# Patient Record
Sex: MALE | Race: WHITE | NOT HISPANIC OR LATINO | Employment: FULL TIME | URBAN - METROPOLITAN AREA
[De-identification: names, ages, dates, MRNs, and addresses within clinical notes are randomized per-mention and may not be internally consistent; named-entity substitution may affect disease eponyms.]

---

## 2017-04-04 ENCOUNTER — TELEPHONE (OUTPATIENT)
Dept: FAMILY MEDICINE | Facility: CLINIC | Age: 47
End: 2017-04-04

## 2017-04-04 DIAGNOSIS — Z20.828 EXPOSURE TO INFLUENZA: Primary | ICD-10-CM

## 2017-04-04 NOTE — TELEPHONE ENCOUNTER
Daughter was diagnosed with influenza B  Requesting preventive [place]    Jocy Lea RN, BS  Clinical Nurse Triage.

## 2017-04-05 RX ORDER — OSELTAMIVIR PHOSPHATE 75 MG/1
75 CAPSULE ORAL DAILY
Qty: 10 CAPSULE | Refills: 0 | Status: SHIPPED | OUTPATIENT
Start: 2017-04-05 | End: 2023-01-19

## 2018-11-30 ENCOUNTER — TRANSFERRED RECORDS (OUTPATIENT)
Dept: HEALTH INFORMATION MANAGEMENT | Facility: CLINIC | Age: 48
End: 2018-11-30

## 2019-11-07 ENCOUNTER — HEALTH MAINTENANCE LETTER (OUTPATIENT)
Age: 49
End: 2019-11-07

## 2020-11-29 ENCOUNTER — HEALTH MAINTENANCE LETTER (OUTPATIENT)
Age: 50
End: 2020-11-29

## 2021-09-25 ENCOUNTER — HEALTH MAINTENANCE LETTER (OUTPATIENT)
Age: 51
End: 2021-09-25

## 2022-01-15 ENCOUNTER — HEALTH MAINTENANCE LETTER (OUTPATIENT)
Age: 52
End: 2022-01-15

## 2022-12-26 ENCOUNTER — HEALTH MAINTENANCE LETTER (OUTPATIENT)
Age: 52
End: 2022-12-26

## 2023-01-19 ENCOUNTER — OFFICE VISIT (OUTPATIENT)
Dept: FAMILY MEDICINE | Facility: CLINIC | Age: 53
End: 2023-01-19
Payer: COMMERCIAL

## 2023-01-19 VITALS
OXYGEN SATURATION: 98 % | BODY MASS INDEX: 32.64 KG/M2 | WEIGHT: 228 LBS | HEART RATE: 68 BPM | DIASTOLIC BLOOD PRESSURE: 80 MMHG | RESPIRATION RATE: 16 BRPM | SYSTOLIC BLOOD PRESSURE: 116 MMHG | TEMPERATURE: 98.1 F | HEIGHT: 70 IN

## 2023-01-19 DIAGNOSIS — G47.00 PERSISTENT DISORDER OF INITIATING OR MAINTAINING SLEEP: ICD-10-CM

## 2023-01-19 DIAGNOSIS — Z11.59 NEED FOR HEPATITIS C SCREENING TEST: ICD-10-CM

## 2023-01-19 DIAGNOSIS — Z13.220 SCREENING FOR HYPERLIPIDEMIA: ICD-10-CM

## 2023-01-19 DIAGNOSIS — Z00.00 ROUTINE GENERAL MEDICAL EXAMINATION AT A HEALTH CARE FACILITY: Primary | ICD-10-CM

## 2023-01-19 PROCEDURE — 99213 OFFICE O/P EST LOW 20 MIN: CPT | Mod: 25 | Performed by: FAMILY MEDICINE

## 2023-01-19 PROCEDURE — 91313 COVID-19 VACCINE BIVALENT BOOSTER 18+ (MODERNA): CPT | Performed by: FAMILY MEDICINE

## 2023-01-19 PROCEDURE — 90715 TDAP VACCINE 7 YRS/> IM: CPT | Performed by: FAMILY MEDICINE

## 2023-01-19 PROCEDURE — 0134A COVID-19 VACCINE BIVALENT BOOSTER 18+ (MODERNA): CPT | Performed by: FAMILY MEDICINE

## 2023-01-19 PROCEDURE — 99386 PREV VISIT NEW AGE 40-64: CPT | Mod: 25 | Performed by: FAMILY MEDICINE

## 2023-01-19 PROCEDURE — 90471 IMMUNIZATION ADMIN: CPT | Performed by: FAMILY MEDICINE

## 2023-01-19 ASSESSMENT — ENCOUNTER SYMPTOMS
CONSTIPATION: 0
SORE THROAT: 0
DIZZINESS: 0
WEAKNESS: 0
FREQUENCY: 0
ABDOMINAL PAIN: 0
DIARRHEA: 0
JOINT SWELLING: 0
SHORTNESS OF BREATH: 0
HEARTBURN: 0
PARESTHESIAS: 0
HEMATOCHEZIA: 0
HEMATURIA: 0
MYALGIAS: 0
EYE PAIN: 0
NAUSEA: 0
COUGH: 0
PALPITATIONS: 0
ARTHRALGIAS: 0
NERVOUS/ANXIOUS: 0
DYSURIA: 0
CHILLS: 0
FEVER: 0
HEADACHES: 0

## 2023-01-19 ASSESSMENT — PAIN SCALES - GENERAL: PAINLEVEL: NO PAIN (0)

## 2023-01-19 NOTE — PROGRESS NOTES
SUBJECTIVE:   CC: Quentin is an 52 year old who presents for preventative health visit.   Patient has been advised of split billing requirements and indicates understanding: Yes  Healthy Habits:     Getting at least 3 servings of Calcium per day:  Yes    Bi-annual eye exam:  Yes    Dental care twice a year:  Yes    Sleep apnea or symptoms of sleep apnea:  Daytime drowsiness    Diet:  Regular (no restrictions)    Frequency of exercise:  4-5 days/week    Duration of exercise:  30-45 minutes    Taking medications regularly:  Yes    Medication side effects:  None    PHQ-2 Total Score: 0    Additional concerns today:  Yes    Feeling well.    PmHx, SHx, FHx reviewed and updated.    Some difficulty falling asleep, will typically take more than hour.  Stopped watching TV in the bedroom.  Does keep a pretty consistent sleep schedule.  Minimal caffeine.          Today's PHQ-2 Score:   PHQ-2 ( 1999 [employer]) 1/19/2023   Q1: Little interest or pleasure in doing things 0   Q2: Feeling down, depressed or hopeless 0   PHQ-2 Score 0   Q1: Little interest or pleasure in doing things Not at all   Q2: Feeling down, depressed or hopeless Not at all   PHQ-2 Score 0       Have you ever done Advance Care Planning? (For example, a Health Directive, POLST, or a discussion with a medical provider or your loved ones about your wishes): No, advance care planning information given to patient to review.  Patient declined advance care planning discussion at this time.    Social History     Tobacco Use     Smoking status: Never     Smokeless tobacco: Never   Substance Use Topics     Alcohol use: Yes     Comment: 0-1 drinks a week     If you drink alcohol do you typically have >3 drinks per day or >7 drinks per week? No    Alcohol Use 1/19/2023   Prescreen: >3 drinks/day or >7 drinks/week? No   Prescreen: >3 drinks/day or >7 drinks/week? -       Last PSA: No results found for: PSA    Reviewed orders with patient. Reviewed health maintenance and updated  "orders accordingly - Yes  Lab work is in process  Labs reviewed in EPIC    Reviewed and updated as needed this visit by clinical staff   Tobacco  Allergies  Meds              Reviewed and updated as needed this visit by Provider                     Review of Systems   Constitutional: Negative for chills and fever.   HENT: Negative for congestion, ear pain, hearing loss and sore throat.    Eyes: Negative for pain and visual disturbance.   Respiratory: Negative for cough and shortness of breath.    Cardiovascular: Negative for chest pain, palpitations and peripheral edema.   Gastrointestinal: Negative for abdominal pain, constipation, diarrhea, heartburn, hematochezia and nausea.   Genitourinary: Negative for dysuria, frequency, genital sores, hematuria, impotence, penile discharge and urgency.   Musculoskeletal: Negative for arthralgias, joint swelling and myalgias.   Skin: Negative for rash.   Neurological: Negative for dizziness, weakness, headaches and paresthesias.   Psychiatric/Behavioral: Negative for mood changes. The patient is not nervous/anxious.          OBJECTIVE:   /80 (BP Location: Right arm, Patient Position: Sitting, Cuff Size: Adult Large)   Pulse 68   Temp 98.1  F (36.7  C) (Oral)   Resp 16   Ht 1.784 m (5' 10.25\")   Wt 103.4 kg (228 lb)   SpO2 98%   BMI 32.48 kg/m      Physical Exam  Vitals and nursing note reviewed.   Constitutional:       Appearance: He is well-developed.   HENT:      Head: Normocephalic and atraumatic.      Right Ear: Tympanic membrane, ear canal and external ear normal.      Left Ear: Tympanic membrane, ear canal and external ear normal.   Eyes:      Pupils: Pupils are equal, round, and reactive to light.   Neck:      Thyroid: No thyromegaly.   Cardiovascular:      Rate and Rhythm: Normal rate and regular rhythm.      Heart sounds: Normal heart sounds.   Pulmonary:      Effort: Pulmonary effort is normal.      Breath sounds: Normal breath sounds.   Abdominal:    "   General: Bowel sounds are normal.      Palpations: Abdomen is soft. There is no mass.   Musculoskeletal:         General: Normal range of motion.   Lymphadenopathy:      Cervical: No cervical adenopathy.   Skin:     General: Skin is warm and dry.      Findings: No rash.   Neurological:      Mental Status: He is alert and oriented to person, place, and time.   Psychiatric:         Judgment: Judgment normal.               ASSESSMENT/PLAN:       ICD-10-CM    1. Routine general medical examination at a health care facility  Z00.00 Comprehensive metabolic panel (BMP + Alb, Alk Phos, ALT, AST, Total. Bili, TP)      2. Need for hepatitis C screening test  Z11.59 Hepatitis C Screen Reflex to HCV RNA Quant and Genotype      3. Screening for hyperlipidemia  Z13.220 Lipid panel reflex to direct LDL Fasting      4. Persistent disorder of initiating or maintaining sleep  G47.00         Advise trial of 3-5mg melatonin 1/2 hour before bed.  Sleep hygiene reviewed.        COUNSELING:   Reviewed preventive health counseling, as reflected in patient instructions        He reports that he has never smoked. He has never used smokeless tobacco.        Quentin Dang MD  [facility]

## 2023-02-03 ENCOUNTER — LAB (OUTPATIENT)
Dept: LAB | Facility: CLINIC | Age: 53
End: 2023-02-03
Payer: COMMERCIAL

## 2023-02-03 DIAGNOSIS — Z00.00 ROUTINE GENERAL MEDICAL EXAMINATION AT A HEALTH CARE FACILITY: ICD-10-CM

## 2023-02-03 DIAGNOSIS — Z11.59 NEED FOR HEPATITIS C SCREENING TEST: ICD-10-CM

## 2023-02-03 DIAGNOSIS — Z13.220 SCREENING FOR HYPERLIPIDEMIA: ICD-10-CM

## 2023-02-03 LAB
ALBUMIN SERPL BCG-MCNC: 4.4 G/DL (ref 3.5–5.2)
ALP SERPL-CCNC: 54 U/L (ref 40–129)
ALT SERPL W P-5'-P-CCNC: 16 U/L (ref 10–50)
ANION GAP SERPL CALCULATED.3IONS-SCNC: 10 MMOL/L (ref 7–15)
AST SERPL W P-5'-P-CCNC: 19 U/L (ref 10–50)
BILIRUB SERPL-MCNC: 1.1 MG/DL
BUN SERPL-MCNC: 17.9 MG/DL (ref 6–20)
CALCIUM SERPL-MCNC: 9.2 MG/DL (ref 8.6–10)
CHLORIDE SERPL-SCNC: 107 MMOL/L (ref 98–107)
CHOLEST SERPL-MCNC: 161 MG/DL
CREAT SERPL-MCNC: 1.17 MG/DL (ref 0.67–1.17)
DEPRECATED HCO3 PLAS-SCNC: 23 MMOL/L (ref 22–29)
GFR SERPL CREATININE-BSD FRML MDRD: 75 ML/MIN/1.73M2
GLUCOSE SERPL-MCNC: 110 MG/DL (ref 70–99)
HCV AB SERPL QL IA: NONREACTIVE
HDLC SERPL-MCNC: 31 MG/DL
LDLC SERPL CALC-MCNC: 79 MG/DL
NONHDLC SERPL-MCNC: 130 MG/DL
POTASSIUM SERPL-SCNC: 4.2 MMOL/L (ref 3.4–5.3)
PROT SERPL-MCNC: 6.7 G/DL (ref 6.4–8.3)
SODIUM SERPL-SCNC: 140 MMOL/L (ref 136–145)
TRIGL SERPL-MCNC: 255 MG/DL

## 2023-02-03 PROCEDURE — 80061 LIPID PANEL: CPT

## 2023-02-03 PROCEDURE — 36415 COLL VENOUS BLD VENIPUNCTURE: CPT

## 2023-02-03 PROCEDURE — 80053 COMPREHEN METABOLIC PANEL: CPT

## 2023-02-03 PROCEDURE — 86803 HEPATITIS C AB TEST: CPT

## 2023-12-10 ENCOUNTER — HOSPITAL ENCOUNTER (INPATIENT)
Facility: CLINIC | Age: 53
LOS: 2 days | Discharge: HOME | End: 2023-12-12
Attending: EMERGENCY MEDICINE | Admitting: INTERNAL MEDICINE
Payer: COMMERCIAL

## 2023-12-10 ENCOUNTER — APPOINTMENT (OUTPATIENT)
Dept: CT IMAGING | Facility: CLINIC | Age: 53
End: 2023-12-10
Attending: EMERGENCY MEDICINE
Payer: COMMERCIAL

## 2023-12-10 ENCOUNTER — OFFICE VISIT (OUTPATIENT)
Dept: URGENT CARE | Facility: URGENT CARE | Age: 53
End: 2023-12-10
Payer: COMMERCIAL

## 2023-12-10 VITALS
HEIGHT: 69 IN | DIASTOLIC BLOOD PRESSURE: 80 MMHG | WEIGHT: 220 LBS | SYSTOLIC BLOOD PRESSURE: 116 MMHG | HEART RATE: 91 BPM | OXYGEN SATURATION: 98 % | BODY MASS INDEX: 32.58 KG/M2 | TEMPERATURE: 99.2 F

## 2023-12-10 DIAGNOSIS — K57.20 DIVERTICULITIS OF COLON WITH PERFORATION: ICD-10-CM

## 2023-12-10 DIAGNOSIS — R50.9 FEVER IN ADULT: ICD-10-CM

## 2023-12-10 DIAGNOSIS — R10.31 ABDOMINAL PAIN, RIGHT LOWER QUADRANT: Primary | ICD-10-CM

## 2023-12-10 LAB
ALBUMIN UR-MCNC: 70 MG/DL
ANION GAP SERPL CALCULATED.3IONS-SCNC: 13 MMOL/L (ref 7–15)
APPEARANCE UR: CLEAR
BACTERIA #/AREA URNS HPF: ABNORMAL /HPF
BASOPHILS # BLD AUTO: 0 10E3/UL (ref 0–0.2)
BASOPHILS NFR BLD AUTO: 0 %
BILIRUB UR QL STRIP: NEGATIVE
BUN SERPL-MCNC: 17.3 MG/DL (ref 6–20)
CALCIUM SERPL-MCNC: 9.1 MG/DL (ref 8.6–10)
CHLORIDE SERPL-SCNC: 98 MMOL/L (ref 98–107)
COLOR UR AUTO: YELLOW
CREAT SERPL-MCNC: 1.2 MG/DL (ref 0.67–1.17)
DEPRECATED HCO3 PLAS-SCNC: 23 MMOL/L (ref 22–29)
EGFRCR SERPLBLD CKD-EPI 2021: 72 ML/MIN/1.73M2
EOSINOPHIL # BLD AUTO: 0.3 10E3/UL (ref 0–0.7)
EOSINOPHIL NFR BLD AUTO: 3 %
ERYTHROCYTE [DISTWIDTH] IN BLOOD BY AUTOMATED COUNT: 12.8 % (ref 10–15)
GLUCOSE SERPL-MCNC: 107 MG/DL (ref 70–99)
GLUCOSE UR STRIP-MCNC: NEGATIVE MG/DL
HCT VFR BLD AUTO: 44.1 % (ref 40–53)
HGB BLD-MCNC: 15 G/DL (ref 13.3–17.7)
HGB UR QL STRIP: ABNORMAL
HOLD SPECIMEN: NORMAL
IMM GRANULOCYTES # BLD: 0.1 10E3/UL
IMM GRANULOCYTES NFR BLD: 1 %
KETONES UR STRIP-MCNC: 10 MG/DL
LEUKOCYTE ESTERASE UR QL STRIP: NEGATIVE
LYMPHOCYTES # BLD AUTO: 1.2 10E3/UL (ref 0.8–5.3)
LYMPHOCYTES NFR BLD AUTO: 12 %
MCH RBC QN AUTO: 28.6 PG (ref 26.5–33)
MCHC RBC AUTO-ENTMCNC: 34 G/DL (ref 31.5–36.5)
MCV RBC AUTO: 84 FL (ref 78–100)
MONOCYTES # BLD AUTO: 0.8 10E3/UL (ref 0–1.3)
MONOCYTES NFR BLD AUTO: 8 %
MUCOUS THREADS #/AREA URNS LPF: PRESENT /LPF
NEUTROPHILS # BLD AUTO: 7.7 10E3/UL (ref 1.6–8.3)
NEUTROPHILS NFR BLD AUTO: 76 %
NITRATE UR QL: NEGATIVE
NRBC # BLD AUTO: 0 10E3/UL
NRBC BLD AUTO-RTO: 0 /100
PH UR STRIP: 6 [PH] (ref 5–7)
PLATELET # BLD AUTO: 329 10E3/UL (ref 150–450)
POTASSIUM SERPL-SCNC: 3.6 MMOL/L (ref 3.4–5.3)
RBC # BLD AUTO: 5.25 10E6/UL (ref 4.4–5.9)
RBC URINE: 8 /HPF
SODIUM SERPL-SCNC: 134 MMOL/L (ref 135–145)
SP GR UR STRIP: 1.03 (ref 1–1.03)
SQUAMOUS EPITHELIAL: 1 /HPF
UROBILINOGEN UR STRIP-MCNC: 2 MG/DL
WBC # BLD AUTO: 10.2 10E3/UL (ref 4–11)
WBC URINE: 4 /HPF

## 2023-12-10 PROCEDURE — 250N000011 HC RX IP 250 OP 636: Performed by: EMERGENCY MEDICINE

## 2023-12-10 PROCEDURE — 258N000003 HC RX IP 258 OP 636: Performed by: INTERNAL MEDICINE

## 2023-12-10 PROCEDURE — 85025 COMPLETE CBC W/AUTO DIFF WBC: CPT | Performed by: EMERGENCY MEDICINE

## 2023-12-10 PROCEDURE — 258N000003 HC RX IP 258 OP 636: Performed by: EMERGENCY MEDICINE

## 2023-12-10 PROCEDURE — 99207 PR INPT ADMISSION FROM CLINIC: CPT | Performed by: PHYSICIAN ASSISTANT

## 2023-12-10 PROCEDURE — 74177 CT ABD & PELVIS W/CONTRAST: CPT

## 2023-12-10 PROCEDURE — 36415 COLL VENOUS BLD VENIPUNCTURE: CPT | Performed by: EMERGENCY MEDICINE

## 2023-12-10 PROCEDURE — 80048 BASIC METABOLIC PNL TOTAL CA: CPT | Performed by: EMERGENCY MEDICINE

## 2023-12-10 PROCEDURE — 120N000001 HC R&B MED SURG/OB

## 2023-12-10 PROCEDURE — 96360 HYDRATION IV INFUSION INIT: CPT | Mod: 59

## 2023-12-10 PROCEDURE — 250N000011 HC RX IP 250 OP 636: Mod: JZ | Performed by: EMERGENCY MEDICINE

## 2023-12-10 PROCEDURE — 99223 1ST HOSP IP/OBS HIGH 75: CPT | Performed by: INTERNAL MEDICINE

## 2023-12-10 PROCEDURE — 81001 URINALYSIS AUTO W/SCOPE: CPT | Performed by: EMERGENCY MEDICINE

## 2023-12-10 PROCEDURE — 99285 EMERGENCY DEPT VISIT HI MDM: CPT | Mod: 25

## 2023-12-10 RX ORDER — PIPERACILLIN SODIUM, TAZOBACTAM SODIUM 4; .5 G/20ML; G/20ML
4.5 INJECTION, POWDER, LYOPHILIZED, FOR SOLUTION INTRAVENOUS ONCE
Status: COMPLETED | OUTPATIENT
Start: 2023-12-10 | End: 2023-12-10

## 2023-12-10 RX ORDER — PIPERACILLIN SODIUM, TAZOBACTAM SODIUM 4; .5 G/20ML; G/20ML
4.5 INJECTION, POWDER, LYOPHILIZED, FOR SOLUTION INTRAVENOUS EVERY 6 HOURS
Status: DISCONTINUED | OUTPATIENT
Start: 2023-12-11 | End: 2023-12-12 | Stop reason: HOSPADM

## 2023-12-10 RX ORDER — KETOROLAC TROMETHAMINE 15 MG/ML
15 INJECTION, SOLUTION INTRAMUSCULAR; INTRAVENOUS EVERY 6 HOURS PRN
Status: DISCONTINUED | OUTPATIENT
Start: 2023-12-10 | End: 2023-12-11

## 2023-12-10 RX ORDER — PROCHLORPERAZINE MALEATE 5 MG
10 TABLET ORAL EVERY 6 HOURS PRN
Status: DISCONTINUED | OUTPATIENT
Start: 2023-12-10 | End: 2023-12-12 | Stop reason: HOSPADM

## 2023-12-10 RX ORDER — IOPAMIDOL 755 MG/ML
500 INJECTION, SOLUTION INTRAVASCULAR ONCE
Status: COMPLETED | OUTPATIENT
Start: 2023-12-10 | End: 2023-12-10

## 2023-12-10 RX ORDER — CALCIUM CARBONATE 500 MG/1
1000 TABLET, CHEWABLE ORAL 4 TIMES DAILY PRN
Status: DISCONTINUED | OUTPATIENT
Start: 2023-12-10 | End: 2023-12-12 | Stop reason: HOSPADM

## 2023-12-10 RX ORDER — LIDOCAINE 40 MG/G
CREAM TOPICAL
Status: DISCONTINUED | OUTPATIENT
Start: 2023-12-10 | End: 2023-12-12 | Stop reason: HOSPADM

## 2023-12-10 RX ORDER — ACETAMINOPHEN 650 MG/1
650 SUPPOSITORY RECTAL EVERY 4 HOURS PRN
Status: DISCONTINUED | OUTPATIENT
Start: 2023-12-10 | End: 2023-12-12 | Stop reason: HOSPADM

## 2023-12-10 RX ORDER — AMOXICILLIN 250 MG
1 CAPSULE ORAL 2 TIMES DAILY PRN
Status: DISCONTINUED | OUTPATIENT
Start: 2023-12-10 | End: 2023-12-12 | Stop reason: HOSPADM

## 2023-12-10 RX ORDER — PROCHLORPERAZINE 25 MG
25 SUPPOSITORY, RECTAL RECTAL EVERY 12 HOURS PRN
Status: DISCONTINUED | OUTPATIENT
Start: 2023-12-10 | End: 2023-12-12 | Stop reason: HOSPADM

## 2023-12-10 RX ORDER — SODIUM CHLORIDE 9 MG/ML
INJECTION, SOLUTION INTRAVENOUS CONTINUOUS
Status: ACTIVE | OUTPATIENT
Start: 2023-12-10 | End: 2023-12-11

## 2023-12-10 RX ORDER — AMOXICILLIN 250 MG
2 CAPSULE ORAL 2 TIMES DAILY PRN
Status: DISCONTINUED | OUTPATIENT
Start: 2023-12-10 | End: 2023-12-12 | Stop reason: HOSPADM

## 2023-12-10 RX ORDER — OXYCODONE HYDROCHLORIDE 5 MG/1
5 TABLET ORAL EVERY 6 HOURS PRN
Status: DISCONTINUED | OUTPATIENT
Start: 2023-12-10 | End: 2023-12-12 | Stop reason: HOSPADM

## 2023-12-10 RX ORDER — ACETAMINOPHEN 325 MG/1
650 TABLET ORAL EVERY 4 HOURS PRN
Status: DISCONTINUED | OUTPATIENT
Start: 2023-12-10 | End: 2023-12-12 | Stop reason: HOSPADM

## 2023-12-10 RX ORDER — ONDANSETRON 4 MG/1
4 TABLET, ORALLY DISINTEGRATING ORAL EVERY 6 HOURS PRN
Status: DISCONTINUED | OUTPATIENT
Start: 2023-12-10 | End: 2023-12-12 | Stop reason: HOSPADM

## 2023-12-10 RX ORDER — ONDANSETRON 2 MG/ML
4 INJECTION INTRAMUSCULAR; INTRAVENOUS EVERY 6 HOURS PRN
Status: DISCONTINUED | OUTPATIENT
Start: 2023-12-10 | End: 2023-12-12 | Stop reason: HOSPADM

## 2023-12-10 RX ADMIN — SODIUM CHLORIDE: 9 INJECTION, SOLUTION INTRAVENOUS at 19:30

## 2023-12-10 RX ADMIN — PIPERACILLIN AND TAZOBACTAM 4.5 G: 4; .5 INJECTION, POWDER, FOR SOLUTION INTRAVENOUS at 17:59

## 2023-12-10 RX ADMIN — IOPAMIDOL 100 ML: 755 INJECTION, SOLUTION INTRAVENOUS at 16:39

## 2023-12-10 RX ADMIN — SODIUM CHLORIDE 1000 ML: 9 INJECTION, SOLUTION INTRAVENOUS at 16:12

## 2023-12-10 ASSESSMENT — ACTIVITIES OF DAILY LIVING (ADL)
TOILETING_ISSUES: NO
ADLS_ACUITY_SCORE: 20
CONCENTRATING,_REMEMBERING_OR_MAKING_DECISIONS_DIFFICULTY: NO
ADLS_ACUITY_SCORE: 20
DIFFICULTY_EATING/SWALLOWING: NO
HEARING_DIFFICULTY_OR_DEAF: NO
DOING_ERRANDS_INDEPENDENTLY_DIFFICULTY: NO
ADLS_ACUITY_SCORE: 35
WEAR_GLASSES_OR_BLIND: YES
DIFFICULTY_COMMUNICATING: NO
DRESSING/BATHING_DIFFICULTY: NO
ADLS_ACUITY_SCORE: 35
CHANGE_IN_FUNCTIONAL_STATUS_SINCE_ONSET_OF_CURRENT_ILLNESS/INJURY: NO
WALKING_OR_CLIMBING_STAIRS_DIFFICULTY: NO
FALL_HISTORY_WITHIN_LAST_SIX_MONTHS: NO

## 2023-12-10 NOTE — ED PROVIDER NOTES
History     Chief Complaint:  Abdominal Pain and Generalized Weakness       HPI   Quentin López is a 53 year old male who presents with RLQ abdominal pain. The pain began as a mild sensation but over the past 3 days he has had worsening pain that is describes as intermittent sharp pain. He has also had intermittent diarrhea, subjective fevers, chills, and overall decreased intake and output. He has a cough as well. No nausea, vomiting, dysuria, frequency, hematuria, or groin pain.       Independent Historian:   None - Patient Only    Review of External Notes:   I reviewed urgent care note from earlier today when the patient was referred to the ER for further evaluation.      Medications:    The patient is currently on no regular medications.    Past Medical History:    Denies significant past medical history.    Past Surgical History:    ACL repair  Colonoscopy     Physical Exam   Patient Vitals for the past 24 hrs:   BP Temp Temp src Pulse Resp SpO2   12/10/23 1559 (!) 146/99 98.6  F (37  C) Temporal 103 22 99 %        Physical Exam  Vitals and nursing note reviewed.   Constitutional:       General: He is not in acute distress.     Appearance: He is not ill-appearing.   HENT:      Head: Normocephalic and atraumatic.      Right Ear: External ear normal.      Left Ear: External ear normal.      Nose: Nose normal.   Eyes:      Conjunctiva/sclera: Conjunctivae normal.   Cardiovascular:      Rate and Rhythm: Normal rate and regular rhythm.      Heart sounds: No murmur heard.  Pulmonary:      Effort: Pulmonary effort is normal. No respiratory distress.      Breath sounds: No wheezing, rhonchi or rales.   Abdominal:      General: Abdomen is flat. There is no distension.      Palpations: Abdomen is soft.      Tenderness: There is abdominal tenderness in the right lower quadrant. There is guarding. There is no rebound.   Musculoskeletal:         General: No swelling or deformity.      Cervical back: Normal range of  motion and neck supple.   Skin:     General: Skin is warm and dry.      Findings: No rash.   Neurological:      Mental Status: He is alert and oriented to person, place, and time.   Psychiatric:         Behavior: Behavior normal.           Emergency Department Course     Imaging:  CT Abdomen Pelvis w Contrast   Final Result   IMPRESSION:    1.  Acute sigmoid diverticulitis with likely contained/local perforation as described above. No large volume pneumoperitoneum or drainable fluid collection at this time. Consider follow-up colonoscopy after treatment if not recently performed.   2.  Partially visualized tree-in-bud pulmonary opacity in the right middle lobe, favor infectious/inflammatory process.   3.  Likely hepatic steatosis.           Laboratory:  Labs Ordered and Resulted from Time of ED Arrival to Time of ED Departure   BASIC METABOLIC PANEL - Abnormal       Result Value    Sodium 134 (*)     Potassium 3.6      Chloride 98      Carbon Dioxide (CO2) 23      Anion Gap 13      Urea Nitrogen 17.3      Creatinine 1.20 (*)     GFR Estimate 72      Calcium 9.1      Glucose 107 (*)    ROUTINE UA WITH MICROSCOPIC REFLEX TO CULTURE - Abnormal    Color Urine Yellow      Appearance Urine Clear      Glucose Urine Negative      Bilirubin Urine Negative      Ketones Urine 10 (*)     Specific Gravity Urine 1.030      Blood Urine Small (*)     pH Urine 6.0      Protein Albumin Urine 70 (*)     Urobilinogen Urine 2.0      Nitrite Urine Negative      Leukocyte Esterase Urine Negative      Bacteria Urine Few (*)     Mucus Urine Present (*)     RBC Urine 8 (*)     WBC Urine 4      Squamous Epithelials Urine 1     CBC WITH PLATELETS AND DIFFERENTIAL    WBC Count 10.2      RBC Count 5.25      Hemoglobin 15.0      Hematocrit 44.1      MCV 84      MCH 28.6      MCHC 34.0      RDW 12.8      Platelet Count 329      % Neutrophils 76      % Lymphocytes 12      % Monocytes 8      % Eosinophils 3      % Basophils 0      % Immature  Granulocytes 1      NRBCs per 100 WBC 0      Absolute Neutrophils 7.7      Absolute Lymphocytes 1.2      Absolute Monocytes 0.8      Absolute Eosinophils 0.3      Absolute Basophils 0.0      Absolute Immature Granulocytes 0.1      Absolute NRBCs 0.0          Emergency Department Course & Assessments:             Interventions:  Medications   piperacillin-tazobactam (ZOSYN) 4.5 g vial to attach to  mL bag (4.5 g Intravenous $New Bag 12/10/23 2524)   lidocaine 1 % 0.1-1 mL (has no administration in time range)   lidocaine (LMX4) cream (has no administration in time range)   sodium chloride (PF) 0.9% PF flush 3 mL (has no administration in time range)   sodium chloride (PF) 0.9% PF flush 3 mL (has no administration in time range)   senna-docusate (SENOKOT-S/PERICOLACE) 8.6-50 MG per tablet 1 tablet (has no administration in time range)     Or   senna-docusate (SENOKOT-S/PERICOLACE) 8.6-50 MG per tablet 2 tablet (has no administration in time range)   calcium carbonate (TUMS) chewable tablet 1,000 mg (has no administration in time range)   acetaminophen (TYLENOL) tablet 650 mg (has no administration in time range)     Or   acetaminophen (TYLENOL) Suppository 650 mg (has no administration in time range)   ondansetron (ZOFRAN ODT) ODT tab 4 mg (has no administration in time range)     Or   ondansetron (ZOFRAN) injection 4 mg (has no administration in time range)   prochlorperazine (COMPAZINE) injection 10 mg (has no administration in time range)     Or   prochlorperazine (COMPAZINE) tablet 10 mg (has no administration in time range)     Or   prochlorperazine (COMPAZINE) suppository 25 mg (has no administration in time range)   piperacillin-tazobactam (ZOSYN) 4.5 g vial to attach to  mL bag (has no administration in time range)   sodium chloride 0.9 % infusion (has no administration in time range)   ketorolac (TORADOL) injection 15 mg (has no administration in time range)   oxyCODONE (ROXICODONE) tablet 5 mg  (has no administration in time range)   sodium chloride 0.9% BOLUS 1,000 mL (1,000 mLs Intravenous $New Bag 12/10/23 1612)   iopamidol (ISOVUE-370) solution 500 mL (100 mLs Intravenous $Given 12/10/23 1639)   sodium chloride (PF) 0.9% PF flush 100 mL (65 mLs Intravenous $Given 12/10/23 1639)        Assessments:  1605 I obtained history and examined the patient, as noted above.  1731 I rechecked and updated the patient.    Independent Interpretation (X-rays, CTs, rhythm strip):  None    Consultations/Discussion of Management or Tests:  1738 I spoke with Dr. Lopez from the hospitalist service regarding the patient's presentation, findings here in the ED, and plan of care.        Social Determinants of Health affecting care:   None    Disposition:  The patient was admitted to the hospital under the care of Dr. Lopez.     Impression & Plan    CMS Diagnoses: None    Medical Decision Makin-year-old male presenting with right lower quadrant pain.  Differential diagnosis includes appendicitis, mesenteric adenitis, epiploic appendagitis, ureteral stone, pyelonephritis, enteritis, etc.  Will check labs and CT for further evaluation.  Patient declines any pain medications.    1738 patient CT shows signs of diverticulitis with contained perforation.  Patient was updated on these findings.  He was given a dose of Zosyn.  I discussed with the hospitalist who accepts admission.      Diagnosis:    ICD-10-CM    1. Diverticulitis of colon with perforation  K57.20              Scribe Disclosure:  I, Narayan Villegas, am serving as a scribe at 4:04 PM on 12/10/2023 to document services personally performed by Nasim Oliver MD based on my observations and the provider's statements to me.   12/10/2023   Nasim Oliver MD Goodwin, Shaun M, MD  12/10/23 1535

## 2023-12-10 NOTE — PROGRESS NOTES
"Assessment & Plan     Abdominal pain, right lower quadrant    DDx: Appendicitis, Diverticulitis, epiploic appendagitis, UTI, Pyelonephritis, Kidney Stone, Bowel Obstruction, Adhesions etc...     Acute problem.  On exam patient is in no acute distress.  Worsening symptoms over the past 5 days.  Low-grade fever here today.  Given limited diagnostic and imaging capabilities in the urgent care setting, recommended further evaluation in the ED to rule out appendicitis versus other intra abdominal abnormalities.  Patient is discharged from urgent care in stable condition.  Transfer to [facility] ED via private car.    Fever in adult  Intermittent fever for the past 5 days with associated right lower quadrant abdominal pain.  Transfer to [facility] ED today for further evaluation.      Return today (on 12/10/2023) for follow up at [facility] ED today for further evaluation.    Dior Lewis PA-C  [facility] ALEXANDREA Saavedra is a 53 year old male who presents to clinic today for the following health issues:  Chief Complaint   Patient presents with    Urgent Care     Dull/sharp pain with chills, night sweats and low grade fever on/off x 6d.      HPI      Abdominal Pain    Location: RLQ   Radiation: None.    Pain character: dull and sharp  Severity: 8 on a scale of 1-10 when sharp.    Duration:  5 days  Course of Illness: worsening  Exacerbated by:  none  Relieved by: nothing  Associated symptoms: chills, sweats, LGF, decreased appetite. Diarrhea earlier this week, now resolved.   Denies: vomiting, melena, hematochezia, dysuria, frequency, and hematuria.  Surgical History: none      Review of Systems  Constitutional, HEENT, cardiovascular, pulmonary, GI, , musculoskeletal, neuro, skin, endocrine and psych systems are negative, except as otherwise noted.      Objective    /80   Pulse 91   Temp 99.2  F (37.3  C) (Oral)   Ht 1.753 m (5' 9\")   Wt 99.8 kg (220 lb)  "  SpO2 98%   BMI 32.49 kg/m    Physical Exam   GENERAL: healthy, alert and no distress  RESP: lungs clear to auscultation - no rales, rhonchi or wheezes  CV: regular rate and rhythm, normal S1 S2  ABDOMEN: soft,  moderate TTP RLQ, no hepatosplenomegaly, no masses and bowel sounds normal  MS: no gross musculoskeletal defects noted, no edema

## 2023-12-10 NOTE — ED NOTES
ED Nurse Handoff Report    ED Chief complaint: Abdominal Pain and Generalized Weakness  . ED Diagnosis:   Final diagnoses:   Diverticulitis of colon with perforation       Allergies:   Allergies   Allergen Reactions    Seasonal Allergies        Code Status: Full Code    Activity level - Baseline/Home:  independent.  Activity Level - Current:   standby.   Lift room needed: No.   Bariatric: No   Needed: No   Isolation: No.   Infection: Not Applicable.     Respiratory status: Room air    Vital Signs (within 30 minutes):   Vitals:    12/10/23 1559   BP: (!) 146/99   Pulse: 103   Resp: 22   Temp: 98.6  F (37  C)   TempSrc: Temporal   SpO2: 99%       Cardiac Rhythm:  ,      Pain level:    Patient confused: No.   Patient Falls Risk: nonskid shoes/slippers when out of bed and activity supervised.   Elimination Status: Has voided     Patient Report - Initial Complaint: Abdominal pain, generalized weakness.   Focused Assessment:   ED arrival note:   Pt reports that he has been having RLQ abdominal pain with low grade subjective fevers per pt. PT states that he has had decreased intake and output. PT denies surgical hx. PT VSS and ABC's intact     RLQ abdominal pain. The pain began as a mild sensation but over the past 3 days he has had worsening pain that is describes as intermittent sharp pain. He has also had intermittent diarrhea, subjective fevers, chills, and overall decreased intake and output. He has a cough as well. No nausea, vomiting, dysuria, frequency, hematuria, or groin pain.      Abnormal Results:   Labs Ordered and Resulted from Time of ED Arrival to Time of ED Departure   BASIC METABOLIC PANEL - Abnormal       Result Value    Sodium 134 (*)     Potassium 3.6      Chloride 98      Carbon Dioxide (CO2) 23      Anion Gap 13      Urea Nitrogen 17.3      Creatinine 1.20 (*)     GFR Estimate 72      Calcium 9.1      Glucose 107 (*)    ROUTINE UA WITH MICROSCOPIC REFLEX  TO CULTURE - Abnormal    Color Urine Yellow      Appearance Urine Clear      Glucose Urine Negative      Bilirubin Urine Negative      Ketones Urine 10 (*)     Specific Gravity Urine 1.030      Blood Urine Small (*)     pH Urine 6.0      Protein Albumin Urine 70 (*)     Urobilinogen Urine 2.0      Nitrite Urine Negative      Leukocyte Esterase Urine Negative      Bacteria Urine Few (*)     Mucus Urine Present (*)     RBC Urine 8 (*)     WBC Urine 4      Squamous Epithelials Urine 1     CBC WITH PLATELETS AND DIFFERENTIAL    WBC Count 10.2      RBC Count 5.25      Hemoglobin 15.0      Hematocrit 44.1      MCV 84      MCH 28.6      MCHC 34.0      RDW 12.8      Platelet Count 329      % Neutrophils 76      % Lymphocytes 12      % Monocytes 8      % Eosinophils 3      % Basophils 0      % Immature Granulocytes 1      NRBCs per 100 WBC 0      Absolute Neutrophils 7.7      Absolute Lymphocytes 1.2      Absolute Monocytes 0.8      Absolute Eosinophils 0.3      Absolute Basophils 0.0      Absolute Immature Granulocytes 0.1      Absolute NRBCs 0.0          CT Abdomen Pelvis w Contrast   Final Result   IMPRESSION:    1.  Acute sigmoid diverticulitis with likely contained/local perforation as described above. No large volume pneumoperitoneum or drainable fluid collection at this time. Consider follow-up colonoscopy after treatment if not recently performed.   2.  Partially visualized tree-in-bud pulmonary opacity in the right middle lobe, favor infectious/inflammatory process.   3.  Likely hepatic steatosis.          Treatments provided: See MAR  Family Comments: Significant other at bedside  OBS brochure/video discussed/provided to patient:  Yes  ED Medications:   Medications   piperacillin-tazobactam (ZOSYN) 4.5 g vial to attach to  mL bag (has no administration in time range)   sodium chloride 0.9% BOLUS 1,000 mL (1,000 mLs Intravenous $New Bag 12/10/23 1612)   iopamidol (ISOVUE-370) solution 500 mL (100 mLs  Intravenous $Given 12/10/23 1639)   sodium chloride (PF) 0.9% PF flush 100 mL (65 mLs Intravenous $Given 12/10/23 1639)       Drips infusing:  No  For the majority of the shift this patient was Green.   Interventions performed were N/A.    Sepsis treatment initiated: No    Cares/treatment/interventions/medications to be completed following ED care: N/A    ED Nurse Name: Maya Balderas RN  5:48 PM    RECEIVING UNIT ED HANDOFF REVIEW    Above ED Nurse Handoff Report was reviewed: Yes  Reviewed by: Rosangela Luis RN on December 10, 2023 at 6:30 PM

## 2023-12-10 NOTE — H&P
[facility]    History and Physical - Hospitalist Service       Date of Admission:  12/10/2023    Assessment & Plan      Quentin López is a 53 year old male admitted on 12/10/2023. He as a past medical history of colonic polyps status post removal 5 years ago and no other past medical history   Patient is being admitted to the hospital after he has been having abdominal pain which has been ongoing for 5 days    Noted to have evidence of a microperforation without abscess formation of the right sigmoid diverticula  1/.  Sigmoid diverticulitis with microperforation which is self-limiting.  Overall patient is feeling better because of the sudden Micropore of which I suppose has happened earlier today.  Will continue with IV antibiotics, has received the first dose in the emergency room.  Will keep him on just ice chips and give him bowel rest.  Patient has not required any pain medications in the emergency room will have as needed hydromorphone/ketorolac be available.  2/.  History of familial polyposis without malignancy patient is scheduled as mentioned below to undergo colonoscopy in 3 months or sooner  3/.  Abdominal pain does not appear to be a surgical abdomen hence I am disinclined to obtain an urgent general surgery consult/colorectal consult.  This can definitely be added on should there be a worsening of overall situation.    Patient is likely going to the hospital for at least a couple days to receive IV antibiotics, diet can be advanced as tolerated.          Diet: NPO for Medical/Clinical Reasons Except for: No Exceptions    DVT Prophylaxis: Low Risk/Ambulatory with no VTE prophylaxis indicated  Teran Catheter: Not present  Lines: None     Cardiac Monitoring: None  Code Status:  Full code    Clinically Significant Risk Factors Present on Admission                       # Obesity: Estimated body mass index is 32.49 kg/m  as calculated from the following:    Height as of an earlier  "encounter on 12/10/23: 1.753 m (5' 9\").    Weight as of an earlier encounter on 12/10/23: 99.8 kg (220 lb).              Disposition Plan    2 days       Thelma Lopez MD  Hospitalist Service  [facility]  Securely message with [email or web address] (more info)  Text page via [facility] Paging/Directory     ______________________________________________________________________    Chief Complaint   Abdominal pain    History is obtained from the patient, electronic health record, and emergency department physician    History of Present Illness   Quentin López is a 53 year old male who has a past medical history of colonic polyps status post removal 5 years ago and no other past medical history which is relevant is being admitted to the emergency department with above complaints.  According the patient, for the last 4-5 days he has been having intermittent right-sided lower abdominal pain with low-grade fever and decreased appetite.  He tells me there has been no change in the bladder or bowel habits.  He has not had a similar pain in the past.  He went to urgent care where there was concerns for possible appendicitis or other intra-abdominal pathology hence was sent to the emergency room workup.  Revealed to have normal WBC count, normal lactic acid unremarkable metabolic panel and there was a microperforation of diverticulitis with no evidence of abscess formation.  In light of the above patient has been requested for inpatient admission.  At the time of my evaluation of the patient in the emergency room he is accompanied by his wife he tells me he actually feels much better this afternoon and as if the pain had suddenly been relieved.  No change in bowel habits, last bowel movement was 2 hours ago.  No nausea or vomiting and no blood in stools.  Patient has a family history of polyposis and hence started having colonoscopies at the age of 43, and has had total of 2 colonoscopies thus far.  The last colonoscopy " 2 polyps were removed which were both benign.  He is scheduled to undergo colonoscopy within the next 60 days.  Patient tells me he is otherwise well he does not have any family history of early colon cancer      Past Medical History    No past medical history on file.    Past Surgical History   Past Surgical History:   Procedure Laterality Date    ARTHROSCOPIC RECONSTRUCTION ANTERIOR AND POSTERIOR CRUCIATE LIGAMENT, COMBINED      left    COLONOSCOPY  10/30/2013    Procedure: COMBINED COLONOSCOPY, SINGLE BIOPSY/POLYPECTOMY BY BIOPSY;  Colonoscopy with biopsy  ;  Surgeon: Lina Padron MD;  Location:  GI       Prior to Admission Medications   None        Review of Systems    The 10 point Review of Systems is negative other than noted in the HPI or here.     Social History   I have reviewed this patient's social history and updated it with pertinent information if needed.  Social History     Tobacco Use    Smoking status: Never    Smokeless tobacco: Never   Vaping Use    Vaping Use: Never used   Substance Use Topics    Alcohol use: Yes     Comment: 0-1 drinks a week    Drug use: No         Family History   I have reviewed this patient's family history and updated it with pertinent information if needed.  Family History   Problem Relation Age of Onset    Lipids Mother     Macular Degeneration Mother     Heart Disease Sister         MI    C.A.D. Brother     Cancer - colorectal Paternal Grandmother 50         Allergies   Allergies   Allergen Reactions    Seasonal Allergies         Physical Exam   Vital Signs: Temp: 98.6  F (37  C) Temp src: Temporal BP: (!) 146/99 Pulse: 103   Resp: 22 SpO2: 99 % O2 Device: None (Room air)    Weight: 0 lbs 0 oz    General Appearance: Alert awake oriented x 3 no acute distress  Respiratory: Clear to auscultation  Cardiovascular: S1-S2  GI: Soft nondistended no voluntary guarding, no rebound or rigidity, bowel sounds are present  Skin: No rash or lesions  Other: No neurological  deficit    Medical Decision Making       76 MINUTES SPENT BY ME on the date of service doing chart review, history, exam, documentation & further activities per the note.      Data     I have personally reviewed the following data over the past 24 hrs:    10.2  \   15.0   / 329     134 (L) 98 17.3 /  107 (H)   3.6 23 1.20 (H) \       Imaging results reviewed over the past 24 hrs:   Recent Results (from the past 24 hour(s))   CT Abdomen Pelvis w Contrast    Narrative    EXAM: CT ABDOMEN PELVIS W CONTRAST  LOCATION: [facility]  DATE: 12/10/2023    INDICATION: Right lower quadrant pain  COMPARISON: None.  TECHNIQUE: CT scan of the abdomen and pelvis was performed following injection of IV contrast. Multiplanar reformats were obtained. Dose reduction techniques were used.  CONTRAST: 100mL Isovue 370    FINDINGS:   LOWER CHEST: Patchy, likely tree-in-bud opacity in the right middle lobe. No additional airspace consolidation or pleural effusion.    HEPATOBILIARY: Diffuse hepatic steatosis. Somewhat geographic area of hypoattenuation in the posterior right hepatic lobe, favor focal fat deposition, less likely atypical cystic lesion, no specific follow-up recommended. No evidence of biliary   obstruction.    PANCREAS: Normal.    SPLEEN: Normal.    ADRENAL GLANDS: Normal.    KIDNEYS/BLADDER: Large right renal cyst, no specific follow-up recommended. No hydronephrosis. Urinary bladder is decompressed but otherwise unremarkable.    BOWEL: Extensive sigmoid diverticulosis with focal inflammation surrounding a diverticulum in the mid sigmoid colon (series 3 image 176). There is adjacent mesenteric stranding and extraluminal gas which appears to extend into the extraperitoneal right   hemipelvis. No large volume pneumoperitoneum, drainable fluid collection or bowel obstruction at this time. Remainder of the colon is unremarkable. Normal appendix.    LYMPH NODES: No suspicious  lymphadenopathy.    VASCULATURE: Unremarkable.    PELVIC ORGANS: Normal.    MUSCULOSKELETAL: No acute bony abnormality.        Impression    IMPRESSION:   1.  Acute sigmoid diverticulitis with likely contained/local perforation as described above. No large volume pneumoperitoneum or drainable fluid collection at this time. Consider follow-up colonoscopy after treatment if not recently performed.  2.  Partially visualized tree-in-bud pulmonary opacity in the right middle lobe, favor infectious/inflammatory process.  3.  Likely hepatic steatosis.

## 2023-12-10 NOTE — ED TRIAGE NOTES
Pt reports that he has been having RLQ abdominal pain with low grade subjective fevers per pt. PT states that he has had decreased intake and output. PT denies surgical hx. PT VSS and ABC's intact

## 2023-12-10 NOTE — PATIENT INSTRUCTIONS
RLQ abdominal pain, fever persistent symptoms x 5 days  Given limited diagnostic imaging and lab capabilities in the urgent care setting recommend further evaluation in the ED to r/o acute appendicitis vs other intra abdominal abnormalities.

## 2023-12-11 LAB
ALBUMIN SERPL BCG-MCNC: 3.4 G/DL (ref 3.5–5.2)
ALP SERPL-CCNC: 64 U/L (ref 40–150)
ALT SERPL W P-5'-P-CCNC: 53 U/L (ref 0–70)
ANION GAP SERPL CALCULATED.3IONS-SCNC: 7 MMOL/L (ref 7–15)
AST SERPL W P-5'-P-CCNC: 23 U/L (ref 0–45)
BASOPHILS # BLD AUTO: 0.1 10E3/UL (ref 0–0.2)
BASOPHILS NFR BLD AUTO: 1 %
BILIRUB SERPL-MCNC: 1.4 MG/DL
BUN SERPL-MCNC: 13.4 MG/DL (ref 6–20)
CALCIUM SERPL-MCNC: 8.3 MG/DL (ref 8.6–10)
CHLORIDE SERPL-SCNC: 106 MMOL/L (ref 98–107)
CREAT SERPL-MCNC: 1.2 MG/DL (ref 0.67–1.17)
DEPRECATED HCO3 PLAS-SCNC: 26 MMOL/L (ref 22–29)
EGFRCR SERPLBLD CKD-EPI 2021: 72 ML/MIN/1.73M2
EOSINOPHIL # BLD AUTO: 0.5 10E3/UL (ref 0–0.7)
EOSINOPHIL NFR BLD AUTO: 7 %
ERYTHROCYTE [DISTWIDTH] IN BLOOD BY AUTOMATED COUNT: 13 % (ref 10–15)
GLUCOSE SERPL-MCNC: 105 MG/DL (ref 70–99)
HCT VFR BLD AUTO: 39.5 % (ref 40–53)
HGB BLD-MCNC: 13.1 G/DL (ref 13.3–17.7)
IMM GRANULOCYTES # BLD: 0.1 10E3/UL
IMM GRANULOCYTES NFR BLD: 1 %
LYMPHOCYTES # BLD AUTO: 1.2 10E3/UL (ref 0.8–5.3)
LYMPHOCYTES NFR BLD AUTO: 15 %
MCH RBC QN AUTO: 28.4 PG (ref 26.5–33)
MCHC RBC AUTO-ENTMCNC: 33.2 G/DL (ref 31.5–36.5)
MCV RBC AUTO: 86 FL (ref 78–100)
MONOCYTES # BLD AUTO: 0.7 10E3/UL (ref 0–1.3)
MONOCYTES NFR BLD AUTO: 8 %
NEUTROPHILS # BLD AUTO: 5.8 10E3/UL (ref 1.6–8.3)
NEUTROPHILS NFR BLD AUTO: 68 %
NRBC # BLD AUTO: 0 10E3/UL
NRBC BLD AUTO-RTO: 0 /100
PLATELET # BLD AUTO: 302 10E3/UL (ref 150–450)
POTASSIUM SERPL-SCNC: 4.1 MMOL/L (ref 3.4–5.3)
PROT SERPL-MCNC: 6.1 G/DL (ref 6.4–8.3)
RBC # BLD AUTO: 4.62 10E6/UL (ref 4.4–5.9)
SODIUM SERPL-SCNC: 139 MMOL/L (ref 135–145)
WBC # BLD AUTO: 8.4 10E3/UL (ref 4–11)

## 2023-12-11 PROCEDURE — 80053 COMPREHEN METABOLIC PANEL: CPT | Performed by: INTERNAL MEDICINE

## 2023-12-11 PROCEDURE — 99232 SBSQ HOSP IP/OBS MODERATE 35: CPT | Performed by: INTERNAL MEDICINE

## 2023-12-11 PROCEDURE — 85025 COMPLETE CBC W/AUTO DIFF WBC: CPT | Performed by: INTERNAL MEDICINE

## 2023-12-11 PROCEDURE — 258N000003 HC RX IP 258 OP 636: Performed by: INTERNAL MEDICINE

## 2023-12-11 PROCEDURE — 36415 COLL VENOUS BLD VENIPUNCTURE: CPT | Performed by: INTERNAL MEDICINE

## 2023-12-11 PROCEDURE — 250N000011 HC RX IP 250 OP 636: Performed by: INTERNAL MEDICINE

## 2023-12-11 PROCEDURE — 120N000001 HC R&B MED SURG/OB

## 2023-12-11 RX ADMIN — SODIUM CHLORIDE: 9 INJECTION, SOLUTION INTRAVENOUS at 08:08

## 2023-12-11 RX ADMIN — PIPERACILLIN AND TAZOBACTAM 4.5 G: 4; .5 INJECTION, POWDER, FOR SOLUTION INTRAVENOUS at 05:23

## 2023-12-11 RX ADMIN — PIPERACILLIN AND TAZOBACTAM 4.5 G: 4; .5 INJECTION, POWDER, FOR SOLUTION INTRAVENOUS at 18:01

## 2023-12-11 RX ADMIN — PIPERACILLIN AND TAZOBACTAM 4.5 G: 4; .5 INJECTION, POWDER, FOR SOLUTION INTRAVENOUS at 11:57

## 2023-12-11 RX ADMIN — PIPERACILLIN AND TAZOBACTAM 4.5 G: 4; .5 INJECTION, POWDER, FOR SOLUTION INTRAVENOUS at 00:44

## 2023-12-11 ASSESSMENT — ACTIVITIES OF DAILY LIVING (ADL)
ADLS_ACUITY_SCORE: 20

## 2023-12-11 NOTE — PLAN OF CARE
Goal Outcome Evaluation:  To Do:  End of Shift Summary  For vital signs and complete assessments, please see documentation flowsheets.     Pertinent assessments: A&O--- up in room and halls --- tolerating clear diet denies pain and nausea ---  Major Shift Events resting well  Treatment Plan: monitor  Bedside Nurse: Cat Delgadillo RN

## 2023-12-11 NOTE — CONSULTS
[facility]  Colon and Rectal Surgery Consult Note  Name: Quentin López    MRN: [id number]  YOB: 1970    Age: 53 year old  Date of admission: 12/10/2023  Primary care provider: Quentin Dang     Requesting Physician: Dr. Mar  Reason for consult: Diverticulitis with microperforation           History of Present Illness:   Quentin López is a 53 year old male with a history of colon polyps, seen at the request of Dr. Mar, presents with abdominal pain. The patient reports that he started to have abdominal pain last Wednesday that was constant but dull. On Sunday, the pain progressed and he started to have intermittent sharp lower and right sided abdominal pains. This prompted him to present to the ER.  In the ER, the patient was afebrile   And was slightly tachycardic to 103 and hypertensive to 146/99.  Labs were remarkable for a sodium 134, creatinine of 1.2, WBC 10.2, and hemoglobin of 15. A CT scan of the abdomen and pelvis revealed acute sigmoid diverticulitis with likely contained and local perforation.  No large volume pneumoperitoneum or drainable fluid collection identified. There was also a partial visualized pulmonary opacity in the right middle lobe and likely hepatic steatosis. He was admitted for further management and treatment and started on IV Zosyn.    Today, the patient reports that his pain has slightly improved.  He does not have much pain if he is just lying in bed. He denies any nausea, vomiting, fever and chills.  He had some looser stools last week, but these have started to form up.  His last bowel movement was last night and was soft and formed.  No bleeding.  This is his 1st episode of diverticulitis. He remains afebrile. AVSS.     Of note, he has a family history of familial adenomatous polyposis.  He denies any family history of colorectal cancer.    Colonoscopy History:  2018: Diverticulosis. Repeat in 5 years. He is due in 60-90 days per  "patient.     Surgical History:   No prior abdominal surgeries            Past Medical History:   No past medical history on file.          Past Surgical History:     Past Surgical History:   Procedure Laterality Date    ARTHROSCOPIC RECONSTRUCTION ANTERIOR AND POSTERIOR CRUCIATE LIGAMENT, COMBINED      left    COLONOSCOPY  10/30/2013    Procedure: COMBINED COLONOSCOPY, SINGLE BIOPSY/POLYPECTOMY BY BIOPSY;  Colonoscopy with biopsy  ;  Surgeon: Lina Padron MD;  Location:  GI               Social History:     Social History     Tobacco Use    Smoking status: Never    Smokeless tobacco: Never   Substance Use Topics    Alcohol use: Yes     Comment: 0-1 drinks a week             Family History:     Family History   Problem Relation Age of Onset    Lipids Mother     Macular Degeneration Mother     Heart Disease Sister         MI    C.A.D. Brother     Cancer - colorectal Paternal Grandmother 50             Allergies:     Allergies   Allergen Reactions    Seasonal Allergies              Medications:      piperacillin-tazobactam  4.5 g Intravenous Q6H    sodium chloride (PF)  3 mL Intracatheter Q8H             Review of Systems:   A comprehensive greater than 10 system review of systems was carried out.  Pertinent positives and negatives are noted above.  Otherwise negative for contributory info.            Physical Exam:     Blood pressure 107/63, pulse 73, temperature 98.6  F (37  C), temperature source Oral, resp. rate 16, height 1.753 m (5' 9\"), weight 102.4 kg (225 lb 12 oz), SpO2 95%.    Intake/Output Summary (Last 24 hours) at 12/11/2023 0938  Last data filed at 12/10/2023 1902  Gross per 24 hour   Intake 3 ml   Output --   Net 3 ml     EXAM:  GEN: Awake alert and oriented, appears stated age  PULM: Non-labored breathing with normal respiratory effort  CVS: reg rate and rhythm, no peripheral edema  ABD: Soft, mildly tender to suprapubic and right lower quadrant, non distended. No rebound or guarding "   RECTAL: Rectal exam was deferred  NEURO: CN II-XII grossly intact  MSK: extremeties with no clubbing, cyanosis or edema; able to ambulate  PSYCH: responsive, alert, cooperative; oriented x3; appropriate mood and affect  EXT/SKIN: inspection reveals no rashes, lesions or ulcers, normal coloring         Data Reviewed:     Results for orders placed or performed during the hospital encounter of 12/10/23   CT Abdomen Pelvis w Contrast    Narrative    EXAM: CT ABDOMEN PELVIS W CONTRAST  LOCATION: [facility]  DATE: 12/10/2023    INDICATION: Right lower quadrant pain  COMPARISON: None.  TECHNIQUE: CT scan of the abdomen and pelvis was performed following injection of IV contrast. Multiplanar reformats were obtained. Dose reduction techniques were used.  CONTRAST: 100mL Isovue 370    FINDINGS:   LOWER CHEST: Patchy, likely tree-in-bud opacity in the right middle lobe. No additional airspace consolidation or pleural effusion.    HEPATOBILIARY: Diffuse hepatic steatosis. Somewhat geographic area of hypoattenuation in the posterior right hepatic lobe, favor focal fat deposition, less likely atypical cystic lesion, no specific follow-up recommended. No evidence of biliary   obstruction.    PANCREAS: Normal.    SPLEEN: Normal.    ADRENAL GLANDS: Normal.    KIDNEYS/BLADDER: Large right renal cyst, no specific follow-up recommended. No hydronephrosis. Urinary bladder is decompressed but otherwise unremarkable.    BOWEL: Extensive sigmoid diverticulosis with focal inflammation surrounding a diverticulum in the mid sigmoid colon (series 3 image 176). There is adjacent mesenteric stranding and extraluminal gas which appears to extend into the extraperitoneal right   hemipelvis. No large volume pneumoperitoneum, drainable fluid collection or bowel obstruction at this time. Remainder of the colon is unremarkable. Normal appendix.    LYMPH NODES: No suspicious lymphadenopathy.    VASCULATURE:  "Unremarkable.    PELVIC ORGANS: Normal.    MUSCULOSKELETAL: No acute bony abnormality.        Impression    IMPRESSION:   1.  Acute sigmoid diverticulitis with likely contained/local perforation as described above. No large volume pneumoperitoneum or drainable fluid collection at this time. Consider follow-up colonoscopy after treatment if not recently performed.  2.  Partially visualized tree-in-bud pulmonary opacity in the right middle lobe, favor infectious/inflammatory process.  3.  Likely hepatic steatosis.       Recent Labs   Lab 12/10/23  1607   WBC 10.2   HGB 15.0   HCT 44.1   MCV 84        Recent Labs   Lab 12/11/23  0622 12/10/23  1607    134*   POTASSIUM 4.1 3.6   CHLORIDE 106 98   CO2 26 23   ANIONGAP 7 13   * 107*   BUN 13.4 17.3   CR 1.20* 1.20*   GFRESTIMATED 72 72   VIKKI 8.3* 9.1   PROTTOTAL 6.1*  --    ALBUMIN 3.4*  --    BILITOTAL 1.4*  --    ALKPHOS 64  --    AST 23  --    ALT 53  --      No results for input(s): \"INR\" in the last 168 hours.  No results for input(s): \"LACT\" in the last 168 hours.  Recent Labs   Lab 12/10/23  1640   COLOR Yellow   APPEARANCE Clear   URINEGLC Negative   URINEBILI Negative   URINEKETONE 10*   SG 1.030   UBLD Small*   URINEPH 6.0   PROTEIN 70*   NITRITE Negative   LEUKEST Negative   RBCU 8*   WBCU 4         Assessment and Plan:   Quentin López is a 53 year old male with a history of colon polyps, seen at the request of Dr. Mar, presents with progressive abdominal pain over the last 5 days. A CT scan of the abdomen and pelvis revealed acute sigmoid diverticulitis with likely contained and local perforation. WBC normal yesterday. Abdominal exam benign. Recommend continued conservative management including bowel rest and IV antibiotics. Continue NPO for now, but he can likely have clear liquids this afternoon if he continues to do well. Did briefly discuss that if he does not improve, we may repeat a CT scan to evaluate for a potential drainable " abscess. Otherwise if he acutely worsens or continues to fail to improve, he may require more emergent surgery which would likely entail a sigmoid resection with a possible temporary stoma. He will need a follow up colonoscopy in 6-8 weeks after the resolution of this episode of diverticulitis. We will continue to follow along.     Plan:  Admit to hospitalist  Surgery: No emergent surgery indicated  Diet: NPO. May be able to have clear liquids later today if continues to do well.   IV Fluids: Per hospitalist  Antibiotics:  IV Zosyn  Medications: Continue home meds per hospitalist  I&O s:  strict I&O s  Labs:   - Reviewed  - Ordered: None   Imaging:   - Dr. Laboy  and myself have personally viewed: CT abd/pelvis  - Ordered:  None  Activity: ambulate as tolerated, encourage OOB  DVT prophylaxis: SCD s  This plan has been discussed with Dr. Laboy    Patient specific identified risk factors considered as part of today s evaluation include: Family history of FAP      Additional history obtained from chart and patient.  Time spent on consultation: 50 minutes, greater than 50 percent of the total encounter time is spent in counseling and/or coordination of care          Jerica Caraballo PA-C  [facility]  Phone:  [phone]

## 2023-12-11 NOTE — PHARMACY-ADMISSION MEDICATION HISTORY
Pharmacist Admission Medication History    Admission medication history is complete. The information provided in this note is only as accurate as the sources available at the time of the update.    Information Source(s): Patient via in-person    Pertinent Information: None    Changes made to PTA medication list:  Added: None  Deleted: None  Changed: None    Medication Affordability:       Allergies reviewed with patient and updates made in EHR: yes    Medication History Completed By: Shay Howard [facility] 12/10/2023 6:29 PM    No outpatient medications have been marked as taking for the 12/10/23 encounter (Hospital Encounter).

## 2023-12-11 NOTE — PLAN OF CARE
Pertinent assessments: A&Ox4. Up ind. VSS. RA. Denies pain/nausea. BS active, passing gas.      Major Shift Events: Uneventful    Treatment Plan: IV zosyn, IVF, Bowel rest  Bedside Nurse: Nuria Juarez RN

## 2023-12-11 NOTE — PROGRESS NOTES
"[facility]  Hospitalist Progress Note  Shree Hair MD 12/11/2023    Reason for Stay (Diagnosis): Abdominal pain         Assessment and Plan:      Summary of Stay: Quentin López is a 53 year old male PMH significant for colonic polyps status post polypectomy 5 years ago due to have another colonoscopy soon who presents to the emergency room with ongoing abdominal pain for the last 5 days and found to have sigmoid diverticulitis with microperforation and admitted on 12/10/2023.      Problem List:   Acute sigmoid diverticulitis with microperforation.  Prior history of colonic polyp  -Patient presented with abdominal pain ongoing for 5 days.  -Imaging study showed diverticulitis with microperforation of the sigmoid.  -His pain is more on the right lower quadrant than the left and not congruent with the imaging finding.  -Continue IV antibiotic Zosyn,  -Consulted colorectal surgery.  -Pain is fairly controlled with Tylenol.  -Will discontinue Toradol.  -Call MD to reexamine the patient if pain gets worse prior to starting the stronger pain medication.  -Started on clear liquid.  -Continue IV fluid.  -Ambulate the patient.  -Patient will need colonoscopy in 6 to 8 weeks as outpatient, noted he is also due for one at around the same time for his colonic polyp follow-up.      Clinically Significant Risk Factors Present on Admission          # Hypocalcemia: Lowest Ca = 8.3 mg/dL in last 2 days, will monitor and replace as appropriate     # Hypoalbuminemia: Lowest albumin = 3.4 g/dL at 12/11/2023  6:22 AM, will monitor as appropriate          # Obesity: Estimated body mass index is 33.34 kg/m  as calculated from the following:    Height as of this encounter: 1.753 m (5' 9\").    Weight as of this encounter: 102.4 kg (225 lb 12 oz).              DVT Prophylaxis: Ambulate every shift  Code Status: Full Code  Discharge Dispo: Home  Estimated Disch Date / # of Days until Disch: 2 more days needs IV " "antibiotic.  I discussed with patient at length the plan of care      Interval History (Subjective):      Patient seen and examined, assumed care today, H&P, labs, medications reviewed.  Patient feels better, no significant abdominal pain, no nausea or vomiting.                  Physical Exam:      Last Vital Signs:  /63 (BP Location: Right arm)   Pulse 73   Temp 98.6  F (37  C) (Oral)   Resp 16   Ht 1.753 m (5' 9\")   Wt 102.4 kg (225 lb 12 oz)   SpO2 95%   BMI 33.34 kg/m      I/O last 3 completed shifts:  In: 3 [I.V.:3]  Out: -   Vitals:    12/10/23 1848   Weight: 102.4 kg (225 lb 12 oz)     Current Facility-Administered Medications   Medication    acetaminophen (TYLENOL) tablet 650 mg    Or    acetaminophen (TYLENOL) Suppository 650 mg    calcium carbonate (TUMS) chewable tablet 1,000 mg    ketorolac (TORADOL) injection 15 mg    lidocaine (LMX4) cream    lidocaine 1 % 0.1-1 mL    ondansetron (ZOFRAN ODT) ODT tab 4 mg    Or    ondansetron (ZOFRAN) injection 4 mg    oxyCODONE (ROXICODONE) tablet 5 mg    piperacillin-tazobactam (ZOSYN) 4.5 g vial to attach to  mL bag    prochlorperazine (COMPAZINE) injection 10 mg    Or    prochlorperazine (COMPAZINE) tablet 10 mg    Or    prochlorperazine (COMPAZINE) suppository 25 mg    senna-docusate (SENOKOT-S/PERICOLACE) 8.6-50 MG per tablet 1 tablet    Or    senna-docusate (SENOKOT-S/PERICOLACE) 8.6-50 MG per tablet 2 tablet    sodium chloride (PF) 0.9% PF flush 3 mL    sodium chloride (PF) 0.9% PF flush 3 mL       Constitutional: Awake, alert, cooperative, no apparent distress     Respiratory: Clear to auscultation bilaterally, no crackles or wheezing   Cardiovascular: Regular rate and rhythm, normal S1 and S2, and no murmur noted   Abdomen: Normal bowel sounds, soft, non-distended, mildly tender right lower and mid abdomen, positive bowel sound, no guarding.   Skin: No rashes, no cyanosis, dry to touch   Neuro: Alert and oriented x3, no weakness, numbness, " memory loss   Extremities: No edema, normal range of motion   Other(s):HEENT Pink, nonicteric, moist oral mucosa       All other systems: Negative          Medications:      All current medications were reviewed with changes reflected in problem list.         Data:      All new lab and imaging data was reviewed.   Labs:  Recent Labs   Lab 12/11/23  1314 12/10/23  1607   WBC 8.4 10.2   HGB 13.1* 15.0   HCT 39.5* 44.1   MCV 86 84    329     Recent Labs   Lab 12/11/23  0622 12/10/23  1607    134*   POTASSIUM 4.1 3.6   CHLORIDE 106 98   CO2 26 23   ANIONGAP 7 13   * 107*   BUN 13.4 17.3   CR 1.20* 1.20*   GFRESTIMATED 72 72   VIKKI 8.3* 9.1   PROTTOTAL 6.1*  --    ALBUMIN 3.4*  --    BILITOTAL 1.4*  --    ALKPHOS 64  --    AST 23  --    ALT 53  --      Recent Labs   Lab 12/11/23  0622 12/10/23  1607   * 107*      Imaging:   Results for orders placed or performed during the hospital encounter of 12/10/23   CT Abdomen Pelvis w Contrast    Narrative    EXAM: CT ABDOMEN PELVIS W CONTRAST  LOCATION: [facility]  DATE: 12/10/2023    INDICATION: Right lower quadrant pain  COMPARISON: None.  TECHNIQUE: CT scan of the abdomen and pelvis was performed following injection of IV contrast. Multiplanar reformats were obtained. Dose reduction techniques were used.  CONTRAST: 100mL Isovue 370    FINDINGS:   LOWER CHEST: Patchy, likely tree-in-bud opacity in the right middle lobe. No additional airspace consolidation or pleural effusion.    HEPATOBILIARY: Diffuse hepatic steatosis. Somewhat geographic area of hypoattenuation in the posterior right hepatic lobe, favor focal fat deposition, less likely atypical cystic lesion, no specific follow-up recommended. No evidence of biliary   obstruction.    PANCREAS: Normal.    SPLEEN: Normal.    ADRENAL GLANDS: Normal.    KIDNEYS/BLADDER: Large right renal cyst, no specific follow-up recommended. No hydronephrosis. Urinary bladder is decompressed  but otherwise unremarkable.    BOWEL: Extensive sigmoid diverticulosis with focal inflammation surrounding a diverticulum in the mid sigmoid colon (series 3 image 176). There is adjacent mesenteric stranding and extraluminal gas which appears to extend into the extraperitoneal right   hemipelvis. No large volume pneumoperitoneum, drainable fluid collection or bowel obstruction at this time. Remainder of the colon is unremarkable. Normal appendix.    LYMPH NODES: No suspicious lymphadenopathy.    VASCULATURE: Unremarkable.    PELVIC ORGANS: Normal.    MUSCULOSKELETAL: No acute bony abnormality.        Impression    IMPRESSION:   1.  Acute sigmoid diverticulitis with likely contained/local perforation as described above. No large volume pneumoperitoneum or drainable fluid collection at this time. Consider follow-up colonoscopy after treatment if not recently performed.  2.  Partially visualized tree-in-bud pulmonary opacity in the right middle lobe, favor infectious/inflammatory process.  3.  Likely hepatic steatosis.

## 2023-12-11 NOTE — PLAN OF CARE
End of Shift Summary  For vital signs and complete assessments, please see documentation flowsheets.     Pertinent assessments: Pt A&Ox4. On RA. VSS. SBA. PIV is running NS at 75mL/hr. At time of admission pt denies pain. Pt reports mild intermittent sharp abdominal pain, but did not want pain medication. NPO/ice chips.   Major Shift Events: admit to unit  Treatment Plan: IV zosyn, IVF, Bowel rest  Bedside Nurse: Rosangela Luis RN

## 2023-12-12 VITALS
DIASTOLIC BLOOD PRESSURE: 78 MMHG | SYSTOLIC BLOOD PRESSURE: 120 MMHG | OXYGEN SATURATION: 94 % | BODY MASS INDEX: 33.44 KG/M2 | HEIGHT: 69 IN | RESPIRATION RATE: 16 BRPM | WEIGHT: 225.75 LBS | TEMPERATURE: 98.7 F | HEART RATE: 78 BPM

## 2023-12-12 LAB
ALBUMIN SERPL BCG-MCNC: 3.9 G/DL (ref 3.5–5.2)
ALP SERPL-CCNC: 66 U/L (ref 40–150)
ALT SERPL W P-5'-P-CCNC: 45 U/L (ref 0–70)
ANION GAP SERPL CALCULATED.3IONS-SCNC: 10 MMOL/L (ref 7–15)
AST SERPL W P-5'-P-CCNC: 18 U/L (ref 0–45)
BILIRUB SERPL-MCNC: 1.3 MG/DL
BUN SERPL-MCNC: 8.8 MG/DL (ref 6–20)
CALCIUM SERPL-MCNC: 8.7 MG/DL (ref 8.6–10)
CHLORIDE SERPL-SCNC: 105 MMOL/L (ref 98–107)
CREAT SERPL-MCNC: 1.16 MG/DL (ref 0.67–1.17)
DEPRECATED HCO3 PLAS-SCNC: 25 MMOL/L (ref 22–29)
EGFRCR SERPLBLD CKD-EPI 2021: 75 ML/MIN/1.73M2
ERYTHROCYTE [DISTWIDTH] IN BLOOD BY AUTOMATED COUNT: 13.1 % (ref 10–15)
GLUCOSE SERPL-MCNC: 118 MG/DL (ref 70–99)
HCT VFR BLD AUTO: 40.9 % (ref 40–53)
HGB BLD-MCNC: 13.6 G/DL (ref 13.3–17.7)
MCH RBC QN AUTO: 28.6 PG (ref 26.5–33)
MCHC RBC AUTO-ENTMCNC: 33.3 G/DL (ref 31.5–36.5)
MCV RBC AUTO: 86 FL (ref 78–100)
PLATELET # BLD AUTO: 345 10E3/UL (ref 150–450)
POTASSIUM SERPL-SCNC: 3.9 MMOL/L (ref 3.4–5.3)
PROT SERPL-MCNC: 6.7 G/DL (ref 6.4–8.3)
RBC # BLD AUTO: 4.76 10E6/UL (ref 4.4–5.9)
SODIUM SERPL-SCNC: 140 MMOL/L (ref 135–145)
WBC # BLD AUTO: 8.8 10E3/UL (ref 4–11)

## 2023-12-12 PROCEDURE — 36415 COLL VENOUS BLD VENIPUNCTURE: CPT | Performed by: INTERNAL MEDICINE

## 2023-12-12 PROCEDURE — 80053 COMPREHEN METABOLIC PANEL: CPT | Performed by: INTERNAL MEDICINE

## 2023-12-12 PROCEDURE — 85027 COMPLETE CBC AUTOMATED: CPT | Performed by: INTERNAL MEDICINE

## 2023-12-12 PROCEDURE — 250N000011 HC RX IP 250 OP 636: Performed by: INTERNAL MEDICINE

## 2023-12-12 PROCEDURE — 99239 HOSP IP/OBS DSCHRG MGMT >30: CPT | Performed by: INTERNAL MEDICINE

## 2023-12-12 RX ORDER — ACETAMINOPHEN 325 MG/1
650 TABLET ORAL EVERY 6 HOURS PRN
COMMUNITY
Start: 2023-12-12 | End: 2024-01-25

## 2023-12-12 RX ORDER — AMOXICILLIN 250 MG
1 CAPSULE ORAL 2 TIMES DAILY PRN
Qty: 30 TABLET | Refills: 0 | Status: SHIPPED | OUTPATIENT
Start: 2023-12-12 | End: 2024-01-25

## 2023-12-12 RX ADMIN — PIPERACILLIN AND TAZOBACTAM 4.5 G: 4; .5 INJECTION, POWDER, FOR SOLUTION INTRAVENOUS at 00:10

## 2023-12-12 RX ADMIN — PIPERACILLIN AND TAZOBACTAM 4.5 G: 4; .5 INJECTION, POWDER, FOR SOLUTION INTRAVENOUS at 11:54

## 2023-12-12 RX ADMIN — PIPERACILLIN AND TAZOBACTAM 4.5 G: 4; .5 INJECTION, POWDER, FOR SOLUTION INTRAVENOUS at 06:39

## 2023-12-12 ASSESSMENT — ACTIVITIES OF DAILY LIVING (ADL)
ADLS_ACUITY_SCORE: 20

## 2023-12-12 NOTE — PLAN OF CARE
Goal Outcome Evaluation:       End of Shift Summary  For vital signs and complete assessments, please see documentation flowsheets.     Pertinent assessments: A&Ox4, On RA, VSS, Denies pain/Nausea, Up ind in the room, tolerating clear diet. COLLETTE MADRID.    Treatment Plan: IV Zosyn

## 2023-12-12 NOTE — PROGRESS NOTES
Pt and wife state understanding of meds -- follow up and instructions ----- meds and diet sheet given

## 2023-12-12 NOTE — DISCHARGE SUMMARY
[facility]  Discharge Summary  Name: Quentin López    MRN: [id number]  YOB: 1970    Age: 53 year old  Date of Discharge:  12/12/2023  Date of Admission: 12/10/2023  Primary Care Provider: Quentin Dang  Discharge Physician:  Shree Hair M.D  Discharging Service:  Hospitalist      Discharge Diagnosis:  Quentin López is a 53 year old male PMH significant for colonic polyps status post polypectomy 5 years ago due to have another colonoscopy soon who presents to the emergency room with ongoing abdominal pain for the last 5 days and found to have sigmoid diverticulitis with microperforation and admitted on 12/10/2023.      Problem List:   Acute sigmoid diverticulitis with microperforation.  Prior history of colonic polyp  -Patient presented with abdominal pain, ongoing for 5 days. Imaging study showed diverticulitis with microperforation of the sigmoid. His pain is more on the right lower quadrant than the left and not congruent with the imaging finding.  He was treated with IV antibiotic Zosyn while in the hospital, for about 48 hours, transition to oral antibiotic Augmentin upon discharge.  -Colorectal surgery was consulted, input appreciated, recommended to repeat CT scan in 10 days, and also follow-up colonoscopy in 6 to 8 weeks and patient is aware of this .  -Pain is fairly controlled with Tylenol.  -He is aware of worsening symptoms, advised to continue full liquid diet for couple of days and advance to low fiber diet for about 5 days and if she continues to improve will transition to regular diet after 5 days     Clinically Significant Risk Factors          # Hypocalcemia: Lowest Ca = 8.3 mg/dL in last 2 days, will monitor and replace as appropriate     # Hypoalbuminemia: Lowest albumin = 3.4 g/dL at 12/11/2023  6:22 AM, will monitor as appropriate            # Obesity: Estimated body mass index is 33.34 kg/m  as calculated from the following:    Height as of this  "encounter: 1.753 m (5' 9\").    Weight as of this encounter: 102.4 kg (225 lb 12 oz)., PRESENT ON ADMISSION              Other Diagnosis:  none     Discharge Disposition:  Discharged to home     Allergies:  Allergies   Allergen Reactions    Seasonal Allergies         Discharge Medications:   Current Discharge Medication List        START taking these medications    Details   acetaminophen (TYLENOL) 325 MG tablet Take 2 tablets (650 mg) by mouth every 6 hours as needed for mild pain or other (and adjunct with moderate or severe pain or per patient request)    Associated Diagnoses: Diverticulitis of colon with perforation; Abdominal pain, right lower quadrant      amoxicillin-clavulanate (AUGMENTIN) 875-125 MG tablet Take 1 tablet by mouth 2 times daily  Qty: 28 tablet, Refills: 0    Associated Diagnoses: Diverticulitis of colon with perforation      senna-docusate (SENOKOT-S/PERICOLACE) 8.6-50 MG tablet Take 1 tablet by mouth 2 times daily as needed for constipation  Qty: 30 tablet, Refills: 0    Associated Diagnoses: Diverticulitis of colon with perforation              Condition on Discharge:  Discharge condition: Stable   Discharge vitals: Blood pressure 120/78, pulse 78, temperature 98.7  F (37.1  C), temperature source Oral, resp. rate 16, height 1.753 m (5' 9\"), weight 102.4 kg (225 lb 12 oz), SpO2 94%.   Code status on discharge: Full Code     History of Illness:  See detailed admission note for full details.    Significant Physical Exam Findings:    Constitutional: Awake, alert, cooperative, no apparent distress      Respiratory: Clear to auscultation bilaterally, no crackles or wheezing   Cardiovascular: Regular rate and rhythm, normal S1 and S2, and no murmur noted   Abdomen: Normal bowel sounds, soft, non-distended, mildly tender right lower and mid abdomen, positive bowel sound, no guarding.   Skin: No rashes, no cyanosis, dry to touch   Neuro: Alert and oriented x3, no weakness, numbness, memory loss "   Extremities: No edema, normal range of motion   Other(s):HEENT Pink, nonicteric, moist oral mucosa         All other systems: Negative       Procedures other than Imaging:  none     Imaging:  Recent Results (from the past 48 hour(s))   CT Abdomen Pelvis w Contrast    Narrative    EXAM: CT ABDOMEN PELVIS W CONTRAST  LOCATION: [facility]  DATE: 12/10/2023    INDICATION: Right lower quadrant pain  COMPARISON: None.  TECHNIQUE: CT scan of the abdomen and pelvis was performed following injection of IV contrast. Multiplanar reformats were obtained. Dose reduction techniques were used.  CONTRAST: 100mL Isovue 370    FINDINGS:   LOWER CHEST: Patchy, likely tree-in-bud opacity in the right middle lobe. No additional airspace consolidation or pleural effusion.    HEPATOBILIARY: Diffuse hepatic steatosis. Somewhat geographic area of hypoattenuation in the posterior right hepatic lobe, favor focal fat deposition, less likely atypical cystic lesion, no specific follow-up recommended. No evidence of biliary   obstruction.    PANCREAS: Normal.    SPLEEN: Normal.    ADRENAL GLANDS: Normal.    KIDNEYS/BLADDER: Large right renal cyst, no specific follow-up recommended. No hydronephrosis. Urinary bladder is decompressed but otherwise unremarkable.    BOWEL: Extensive sigmoid diverticulosis with focal inflammation surrounding a diverticulum in the mid sigmoid colon (series 3 image 176). There is adjacent mesenteric stranding and extraluminal gas which appears to extend into the extraperitoneal right   hemipelvis. No large volume pneumoperitoneum, drainable fluid collection or bowel obstruction at this time. Remainder of the colon is unremarkable. Normal appendix.    LYMPH NODES: No suspicious lymphadenopathy.    VASCULATURE: Unremarkable.    PELVIC ORGANS: Normal.    MUSCULOSKELETAL: No acute bony abnormality.        Impression    IMPRESSION:   1.  Acute sigmoid diverticulitis with likely contained/local  perforation as described above. No large volume pneumoperitoneum or drainable fluid collection at this time. Consider follow-up colonoscopy after treatment if not recently performed.  2.  Partially visualized tree-in-bud pulmonary opacity in the right middle lobe, favor infectious/inflammatory process.  3.  Likely hepatic steatosis.        Consultations:  Consultation during this admission received from surgery.     Recent Lab Results:  Recent Labs   Lab 12/12/23  0701 12/11/23  1314 12/10/23  1607   WBC 8.8 8.4 10.2   HGB 13.6 13.1* 15.0   HCT 40.9 39.5* 44.1   MCV 86 86 84    302 329     Recent Labs   Lab 12/12/23  0701 12/11/23  0622 12/10/23  1607    139 134*   POTASSIUM 3.9 4.1 3.6   CHLORIDE 105 106 98   CO2 25 26 23   ANIONGAP 10 7 13   * 105* 107*   BUN 8.8 13.4 17.3   CR 1.16 1.20* 1.20*   GFRESTIMATED 75 72 72   VIKKI 8.7 8.3* 9.1     Recent Labs   Lab 12/12/23  0701 12/11/23  0622 12/10/23  1607   * 105* 107*          Pending Results:    Unresulted Labs Ordered in the Past 30 Days of this Admission       No orders found from 11/10/2023 to 12/11/2023.                Reason for your hospital stay    Complicated diverticulitis     Follow-up and recommended labs and tests     Follow up with primary care provider, Quentin Dang, within 7 days for hospital follow- up.   Follow up CT scan Abd/pelvis in 10 days as an outpatient per colorectal recommendation.  Colonoscopy as an outpatient in 6 to 8 weeks.  If you develop abdominal pain, fever, nausea, vomiting, or any new symptoms, you need to come back to the emergency department for further evaluation.     Activity    Your activity upon discharge: activity as tolerated     Diet    Follow this diet upon discharge: Orders Placed This Encounter  Full liquid diet for today, if no symptoms, start low fiber diet tomorrow and continue for 5 days, then advance to regular diet.          Total time spent in face to face contact with the  patient and coordinating discharge was:  >30 Minutes.

## 2023-12-12 NOTE — PROGRESS NOTES
COLON & RECTAL SURGERY  PROGRESS NOTE    December 12, 2023    SUBJECTIVE:  He is feeling much better today. He denies any pain, nausea, and vomiting. Tolerating clears. Having some looser stools without blood.     OBJECTIVE:  Temp:  [97.9  F (36.6  C)-98.7  F (37.1  C)] 98.7  F (37.1  C)  Pulse:  [75-78] 78  Resp:  [16-24] 16  BP: (118-128)/(74-78) 120/78  SpO2:  [94 %-96 %] 94 %    Intake/Output Summary (Last 24 hours) at 12/12/2023 0830  Last data filed at 12/11/2023 1800  Gross per 24 hour   Intake 600 ml   Output --   Net 600 ml       GENERAL:  Awake, alert, no acute distress, lying in bed  HEAD: Nomocephalic atraumatic  SCLERA: anicteric  EXTREMITIES: warm and well perfused  ABDOMEN:  Soft, non- tender, non-distended, no rebound or guarding, no peritoneal signs    LABS:  Lab Results   Component Value Date    WBC 8.8 12/12/2023    WBC 7.1 08/17/2015     Lab Results   Component Value Date    HGB 13.6 12/12/2023    HGB 15.9 08/17/2015     Lab Results   Component Value Date    HCT 40.9 12/12/2023    HCT 44.8 08/17/2015     Lab Results   Component Value Date     12/12/2023     08/17/2015     Last Basic Metabolic Panel:  Lab Results   Component Value Date     12/12/2023     08/17/2015      Lab Results   Component Value Date    POTASSIUM 3.9 12/12/2023    POTASSIUM 3.8 08/17/2015     Lab Results   Component Value Date    CHLORIDE 105 12/12/2023    CHLORIDE 107 08/17/2015     Lab Results   Component Value Date    VIKKI 8.7 12/12/2023    VIKKI 8.8 08/17/2015     Lab Results   Component Value Date    CO2 25 12/12/2023    CO2 30 08/17/2015     Lab Results   Component Value Date    BUN 8.8 12/12/2023    BUN 12 08/17/2015     Lab Results   Component Value Date    CR 1.16 12/12/2023    CR 1.11 08/17/2015     Lab Results   Component Value Date     12/12/2023     08/17/2015       ASSESSMENT/PLAN: Quentin is a 53 year old man admitted with diverticulitis with a microperforation and without a  drainable fluid collection.     - Full liquids  - Continue IV antibiotics. May start transition to PO antibiotics once tolerating fulls.  - Pain management as needed, minimize narcotics  - Encourage ambulation  - No plans for surgery  - Plan for outpatient CT scan in 10 days to ensure resolution  - Will need colonoscopy in 6-8 weeks      For questions/paging, please contact the [facility] office at [phone].    Jerica Caraballo PA-C  [facility]  Phone: [phone]

## 2023-12-22 ENCOUNTER — HOSPITAL ENCOUNTER (OUTPATIENT)
Dept: CT IMAGING | Facility: CLINIC | Age: 53
Discharge: HOME | End: 2023-12-22
Payer: COMMERCIAL

## 2023-12-22 PROCEDURE — 250N000011 HC RX IP 250 OP 636

## 2023-12-22 PROCEDURE — 250N000009 HC RX 250

## 2023-12-22 PROCEDURE — 74177 CT ABD & PELVIS W/CONTRAST: CPT

## 2023-12-22 RX ORDER — IOPAMIDOL 755 MG/ML
100 INJECTION, SOLUTION INTRAVASCULAR ONCE
Status: COMPLETED | OUTPATIENT
Start: 2023-12-22 | End: 2023-12-22

## 2023-12-22 RX ADMIN — IOPAMIDOL 100 ML: 755 INJECTION, SOLUTION INTRAVENOUS at 10:10

## 2023-12-22 RX ADMIN — SODIUM CHLORIDE 65 ML: 9 INJECTION, SOLUTION INTRAVENOUS at 10:10

## 2024-01-04 ENCOUNTER — PATIENT OUTREACH (OUTPATIENT)
Dept: CARE COORDINATION | Facility: CLINIC | Age: 54
End: 2024-01-04
Payer: COMMERCIAL

## 2024-01-18 ENCOUNTER — PATIENT OUTREACH (OUTPATIENT)
Dept: CARE COORDINATION | Facility: CLINIC | Age: 54
End: 2024-01-18
Payer: COMMERCIAL

## 2024-01-24 ENCOUNTER — TRANSCRIBE ORDERS (OUTPATIENT)
Dept: GASTROENTEROLOGY | Facility: CLINIC | Age: 54
End: 2024-01-24
Payer: COMMERCIAL

## 2024-01-24 ENCOUNTER — TELEPHONE (OUTPATIENT)
Dept: GASTROENTEROLOGY | Facility: CLINIC | Age: 54
End: 2024-01-24
Payer: COMMERCIAL

## 2024-01-24 DIAGNOSIS — K57.20 DIVERTICULITIS OF COLON WITH PERFORATION: Primary | ICD-10-CM

## 2024-01-24 NOTE — TELEPHONE ENCOUNTER
REMINDER: We do not accept [employer] insurance.      Procedure Scheduled: Lower Endoscopy [Colonoscopy]  Procedure Date: 1/30/2024 @ 7:30am  Site:[facility]; [address], [place] [address]  Endoscopist: CHINEDU  Ordering Provider: CHINEDU   Scheduled by (per the direction of): Fax from [facility].

## 2024-01-30 ENCOUNTER — HOSPITAL ENCOUNTER (OUTPATIENT)
Facility: CLINIC | Age: 54
Discharge: HOME | End: 2024-01-30
Attending: COLON & RECTAL SURGERY | Admitting: COLON & RECTAL SURGERY
Payer: COMMERCIAL

## 2024-01-30 VITALS
BODY MASS INDEX: 31.84 KG/M2 | SYSTOLIC BLOOD PRESSURE: 110 MMHG | OXYGEN SATURATION: 90 % | RESPIRATION RATE: 16 BRPM | DIASTOLIC BLOOD PRESSURE: 76 MMHG | HEART RATE: 71 BPM | HEIGHT: 69 IN | WEIGHT: 215 LBS

## 2024-01-30 DIAGNOSIS — Z20.9 INFECTIOUS DISEASE CONTACT: Primary | ICD-10-CM

## 2024-01-30 LAB — COLONOSCOPY: NORMAL

## 2024-01-30 PROCEDURE — 45380 COLONOSCOPY AND BIOPSY: CPT | Mod: PT,XU

## 2024-01-30 PROCEDURE — 250N000011 HC RX IP 250 OP 636: Performed by: COLON & RECTAL SURGERY

## 2024-01-30 PROCEDURE — 250N000013 HC RX MED GY IP 250 OP 250 PS 637: Performed by: COLON & RECTAL SURGERY

## 2024-01-30 PROCEDURE — 45385 COLONOSCOPY W/LESION REMOVAL: CPT | Performed by: COLON & RECTAL SURGERY

## 2024-01-30 PROCEDURE — G0500 MOD SEDAT ENDO SERVICE >5YRS: HCPCS | Performed by: COLON & RECTAL SURGERY

## 2024-01-30 PROCEDURE — 88305 TISSUE EXAM BY PATHOLOGIST: CPT | Mod: TC | Performed by: COLON & RECTAL SURGERY

## 2024-01-30 PROCEDURE — 88305 TISSUE EXAM BY PATHOLOGIST: CPT | Mod: 26 | Performed by: PATHOLOGY

## 2024-01-30 RX ORDER — PROCHLORPERAZINE MALEATE 10 MG
10 TABLET ORAL EVERY 6 HOURS PRN
Status: DISCONTINUED | OUTPATIENT
Start: 2024-01-30 | End: 2024-01-30 | Stop reason: HOSPADM

## 2024-01-30 RX ORDER — NALOXONE HYDROCHLORIDE 0.4 MG/ML
0.4 INJECTION, SOLUTION INTRAMUSCULAR; INTRAVENOUS; SUBCUTANEOUS
Status: DISCONTINUED | OUTPATIENT
Start: 2024-01-30 | End: 2024-01-30 | Stop reason: HOSPADM

## 2024-01-30 RX ORDER — DIPHENHYDRAMINE HYDROCHLORIDE 50 MG/ML
25-50 INJECTION INTRAMUSCULAR; INTRAVENOUS
Status: DISCONTINUED | OUTPATIENT
Start: 2024-01-30 | End: 2024-01-30 | Stop reason: HOSPADM

## 2024-01-30 RX ORDER — FLUMAZENIL 0.1 MG/ML
0.2 INJECTION, SOLUTION INTRAVENOUS
Status: DISCONTINUED | OUTPATIENT
Start: 2024-01-30 | End: 2024-01-30 | Stop reason: HOSPADM

## 2024-01-30 RX ORDER — SIMETHICONE 40MG/0.6ML
133 SUSPENSION, DROPS(FINAL DOSAGE FORM)(ML) ORAL
Status: COMPLETED | OUTPATIENT
Start: 2024-01-30 | End: 2024-01-30

## 2024-01-30 RX ORDER — ONDANSETRON 2 MG/ML
4 INJECTION INTRAMUSCULAR; INTRAVENOUS
Status: DISCONTINUED | OUTPATIENT
Start: 2024-01-30 | End: 2024-01-30 | Stop reason: HOSPADM

## 2024-01-30 RX ORDER — NALOXONE HYDROCHLORIDE 0.4 MG/ML
0.2 INJECTION, SOLUTION INTRAMUSCULAR; INTRAVENOUS; SUBCUTANEOUS
Status: DISCONTINUED | OUTPATIENT
Start: 2024-01-30 | End: 2024-01-30 | Stop reason: HOSPADM

## 2024-01-30 RX ORDER — LIDOCAINE 40 MG/G
CREAM TOPICAL
Status: DISCONTINUED | OUTPATIENT
Start: 2024-01-30 | End: 2024-01-30 | Stop reason: HOSPADM

## 2024-01-30 RX ORDER — ONDANSETRON 4 MG/1
4 TABLET, ORALLY DISINTEGRATING ORAL EVERY 6 HOURS PRN
Status: DISCONTINUED | OUTPATIENT
Start: 2024-01-30 | End: 2024-01-30 | Stop reason: HOSPADM

## 2024-01-30 RX ORDER — ATROPINE SULFATE 0.1 MG/ML
1 INJECTION INTRAVENOUS
Status: DISCONTINUED | OUTPATIENT
Start: 2024-01-30 | End: 2024-01-30 | Stop reason: HOSPADM

## 2024-01-30 RX ORDER — FENTANYL CITRATE 50 UG/ML
50-100 INJECTION, SOLUTION INTRAMUSCULAR; INTRAVENOUS EVERY 5 MIN PRN
Status: DISCONTINUED | OUTPATIENT
Start: 2024-01-30 | End: 2024-01-30 | Stop reason: HOSPADM

## 2024-01-30 RX ORDER — EPINEPHRINE 1 MG/ML
0.1 INJECTION, SOLUTION INTRAMUSCULAR; SUBCUTANEOUS
Status: DISCONTINUED | OUTPATIENT
Start: 2024-01-30 | End: 2024-01-30 | Stop reason: HOSPADM

## 2024-01-30 RX ORDER — ONDANSETRON 2 MG/ML
4 INJECTION INTRAMUSCULAR; INTRAVENOUS EVERY 6 HOURS PRN
Status: DISCONTINUED | OUTPATIENT
Start: 2024-01-30 | End: 2024-01-30 | Stop reason: HOSPADM

## 2024-01-30 RX ADMIN — FENTANYL CITRATE 50 MCG: 0.05 INJECTION, SOLUTION INTRAMUSCULAR; INTRAVENOUS at 07:42

## 2024-01-30 RX ADMIN — MIDAZOLAM 1 MG: 1 INJECTION INTRAMUSCULAR; INTRAVENOUS at 07:41

## 2024-01-30 RX ADMIN — MIDAZOLAM 1 MG: 1 INJECTION INTRAMUSCULAR; INTRAVENOUS at 07:34

## 2024-01-30 RX ADMIN — MIDAZOLAM 2 MG: 1 INJECTION INTRAMUSCULAR; INTRAVENOUS at 07:32

## 2024-01-30 RX ADMIN — FENTANYL CITRATE 100 MCG: 0.05 INJECTION, SOLUTION INTRAMUSCULAR; INTRAVENOUS at 07:31

## 2024-01-30 RX ADMIN — SIMETHICONE 133 MG: 20 SUSPENSION/ DROPS ORAL at 07:44

## 2024-01-30 ASSESSMENT — ACTIVITIES OF DAILY LIVING (ADL): ADLS_ACUITY_SCORE: 35

## 2024-01-30 NOTE — PROGRESS NOTES
CP- screening information    This patient was admitted to the same unit as a patient with CP- in Dec 2023 and is at risk for carrying CP-. The [place] Department of Health ([facility]) and CDC recommend that we screen this patient to prevent the spread of these organisms within our healthcare facility. Screening is voluntary and includes collecting a rectal swab for CP-.   Please notify Infection Prevention if the patient declines testing.  Additional information and resources can be found on the Infection Prevention [employer] page.     1/30/2024  Angella Mix, Infection Prevention       Swab unable to be collected as swab to use was not sent from lab.  Angella Mix, Infection Prevention

## 2024-01-30 NOTE — H&P
Pre-Endoscopy History and Physical     Quentin López MRN# [id number]   YOB: 1970 Age: 53 year old     Date of Procedure: 1/30/2024  Primary care provider: Quentin Dang  Type of Endoscopy: colonoscopy  Reason for Procedure: surveillance  Type of Anesthesia Anticipated: Moderate Sedation    HPI:    Quentin is a 53 year old male who will be undergoing the above procedure.      A history and physical has been performed. The patient's medications and allergies have been reviewed. The risks and benefits of the procedure and the sedation options and risks were discussed with the patient.  All questions were answered and informed consent was obtained.      He denies a personal or family history of anesthesia complications or bleeding disorders.     Allergies   Allergen Reactions    Seasonal Allergies         None       Patient Active Problem List   Diagnosis    Seasonal allergic rhinitis    CARDIOVASCULAR SCREENING; LDL GOAL LESS THAN 160    Family history of colon cancer    Obesity    Diverticulitis of colon with perforation        Past Medical History:   Diagnosis Date    Diverticulitis of colon         Past Surgical History:   Procedure Laterality Date    ARTHROSCOPIC RECONSTRUCTION ANTERIOR AND POSTERIOR CRUCIATE LIGAMENT, COMBINED      left    COLONOSCOPY  10/30/2013    Procedure: COMBINED COLONOSCOPY, SINGLE BIOPSY/POLYPECTOMY BY BIOPSY;  Colonoscopy with biopsy  ;  Surgeon: Lina Padron MD;  Location:  GI       Social History     Tobacco Use    Smoking status: Never    Smokeless tobacco: Never   Substance Use Topics    Alcohol use: Yes     Comment: 0-1 drinks a week       Family History   Problem Relation Age of Onset    Lipids Mother     Macular Degeneration Mother     Cancer - colorectal Paternal Grandmother 50    Colon Cancer Paternal Grandfather     C.A.D. Brother     Heart Disease Sister         [place]       REVIEW OF SYSTEMS:     5 point ROS negative except as noted above in HPI,  "including Gen., Resp., CV, GI &  system review.      PHYSICAL EXAM:   /72   Pulse 74   Resp 19   Ht 1.753 m (5' 9\")   Wt 97.5 kg (215 lb)   BMI 31.75 kg/m   Estimated body mass index is 31.75 kg/m  as calculated from the following:    Height as of this encounter: 1.753 m (5' 9\").    Weight as of this encounter: 97.5 kg (215 lb).   GENERAL APPEARANCE: healthy and alert  MENTAL STATUS: alert  AIRWAY EXAM: Mallampatti Class II (visualization of the soft palate, fauces, and uvula)  RESP: lungs clear to auscultation - no rales, rhonchi or wheezes  CV: regular rates and rhythm      DIAGNOSTICS:    Not indicated      IMPRESSION   ASA Class 2 - Mild systemic disease        PLAN:       Plan for colonoscopy. We discussed the risks, benefits and alternatives and the patient wished to proceed.    The above has been forwarded to the consulting provider.      Signed Electronically by: Gris Laboy MD, MD  January 30, 2024    "

## 2024-02-01 LAB
PATH REPORT.COMMENTS IMP SPEC: NORMAL
PATH REPORT.COMMENTS IMP SPEC: NORMAL
PATH REPORT.FINAL DX SPEC: NORMAL
PATH REPORT.GROSS SPEC: NORMAL
PATH REPORT.MICROSCOPIC SPEC OTHER STN: NORMAL
PATH REPORT.RELEVANT HX SPEC: NORMAL
PHOTO IMAGE: NORMAL

## 2024-04-14 ENCOUNTER — HEALTH MAINTENANCE LETTER (OUTPATIENT)
Age: 54
End: 2024-04-14

## 2025-04-19 ENCOUNTER — HEALTH MAINTENANCE LETTER (OUTPATIENT)
Age: 55
End: 2025-04-19

## (undated) DEVICE — KIT ENDO TURNOVER/PROCEDURE W/CLEAN A SCOPE LINERS 103888

## (undated) DEVICE — ENDO TRAP POLYP QUICK CATCH 710201

## (undated) DEVICE — ENDO FORCEP ENDOJAW BIOPSY 2.8MMX230CM FB-220U

## (undated) DEVICE — ENDO SNARE POLYPECTOMY OVAL 15MM LOOP SD-240U-15

## (undated) RX ORDER — FENTANYL CITRATE 50 UG/ML
INJECTION, SOLUTION INTRAMUSCULAR; INTRAVENOUS
Status: DISPENSED
Start: 2024-01-30

## (undated) RX ORDER — SIMETHICONE 40MG/0.6ML
SUSPENSION, DROPS(FINAL DOSAGE FORM)(ML) ORAL
Status: DISPENSED
Start: 2024-01-30